# Patient Record
Sex: MALE | Race: WHITE | NOT HISPANIC OR LATINO | Employment: FULL TIME | URBAN - METROPOLITAN AREA
[De-identification: names, ages, dates, MRNs, and addresses within clinical notes are randomized per-mention and may not be internally consistent; named-entity substitution may affect disease eponyms.]

---

## 2023-03-14 ENCOUNTER — TELEMEDICINE (OUTPATIENT)
Dept: PHARMACY | Facility: HOSPITAL | Age: 58
End: 2023-03-14
Payer: COMMERCIAL

## 2023-03-14 DIAGNOSIS — E11.9 DIABETES MELLITUS WITHOUT COMPLICATION (MULTI): Primary | ICD-10-CM

## 2023-03-14 DIAGNOSIS — E11.9 DIABETES MELLITUS WITHOUT COMPLICATION (MULTI): ICD-10-CM

## 2023-03-14 RX ORDER — SEMAGLUTIDE 1.34 MG/ML
1 INJECTION, SOLUTION SUBCUTANEOUS
Qty: 3 ML | Refills: 0 | Status: SHIPPED | OUTPATIENT
Start: 2023-03-14 | End: 2023-06-16 | Stop reason: ALTCHOICE

## 2023-03-14 NOTE — PROGRESS NOTES
Subjective   Victorino Carr is a 57 y.o. male who presents today for Ozempic titration.    Patient hospitalized since last visit, medication list reviewed and reconciled with discharge medications (For ACO requirement): no    HPI     Objective   Medication Dose Adjustment(s): yes  Allergy Drug Reaction(s): Patient has no allergy information on record.  Adverse Drug Reaction(s): no  Interaction(s): Drug-Drug: no  Interaction(s): Drug-Disease: no  New Medication(s) Initiated:   No current outpatient medications on file.     No current facility-administered medications for this visit.       Assessment /Plan   Potentially Inappropriate Medication(s): no  Medication Adherence: Always take medication as prescribed  Other needs: Pt has been taking Ozempic 0.5mg dose instead of 0.25mg dose once a week. He has had no side effects and doing well on medication. I will have him finish 0.5mg pen for 4 injections and then have him start 1mg pen dose.  Medication Education provided: yes  Additional Monitoring: Follow up in 3 weeks  Time Spent on Encounter: 15 Minutes  Total Clinical Interventions: 1

## 2023-03-14 NOTE — PATIENT INSTRUCTIONS
Hello  Finish 4 doses of 0.5mg Ozempic pen then increase to 1mg pen.  Will follow up with you in 3 weeks.

## 2023-03-16 NOTE — PROGRESS NOTES
I reviewed the progress note and agree with the resident’s findings and plans as written. Case discussed with resident.    Da Ludwig, PharmD

## 2023-05-29 DIAGNOSIS — I10 HYPERTENSION, UNSPECIFIED TYPE: Primary | ICD-10-CM

## 2023-05-30 PROBLEM — I10 HTN (HYPERTENSION): Status: ACTIVE | Noted: 2023-05-30

## 2023-05-30 RX ORDER — LISINOPRIL 20 MG/1
TABLET ORAL
Qty: 90 TABLET | Refills: 3 | Status: SHIPPED | OUTPATIENT
Start: 2023-05-30 | End: 2023-10-25 | Stop reason: SDUPTHER

## 2023-06-07 ENCOUNTER — APPOINTMENT (OUTPATIENT)
Dept: PRIMARY CARE | Facility: CLINIC | Age: 58
End: 2023-06-07
Payer: COMMERCIAL

## 2023-06-16 ENCOUNTER — OFFICE VISIT (OUTPATIENT)
Dept: PRIMARY CARE | Facility: CLINIC | Age: 58
End: 2023-06-16
Payer: COMMERCIAL

## 2023-06-16 VITALS
TEMPERATURE: 97.8 F | WEIGHT: 270 LBS | BODY MASS INDEX: 34.65 KG/M2 | HEART RATE: 60 BPM | DIASTOLIC BLOOD PRESSURE: 82 MMHG | HEIGHT: 74 IN | RESPIRATION RATE: 16 BRPM | SYSTOLIC BLOOD PRESSURE: 143 MMHG

## 2023-06-16 DIAGNOSIS — E11.65 UNCONTROLLED TYPE 2 DIABETES MELLITUS WITH HYPERGLYCEMIA (MULTI): ICD-10-CM

## 2023-06-16 DIAGNOSIS — I25.10 ARTERIOSCLEROSIS OF CORONARY ARTERY: ICD-10-CM

## 2023-06-16 DIAGNOSIS — F41.9 ANXIETY: Primary | ICD-10-CM

## 2023-06-16 DIAGNOSIS — E11.9 DIABETES MELLITUS WITHOUT COMPLICATION (MULTI): ICD-10-CM

## 2023-06-16 DIAGNOSIS — E11.9 TYPE 2 DIABETES MELLITUS WITHOUT COMPLICATION, WITH LONG-TERM CURRENT USE OF INSULIN (MULTI): ICD-10-CM

## 2023-06-16 DIAGNOSIS — Z79.4 TYPE 2 DIABETES MELLITUS WITHOUT COMPLICATION, WITH LONG-TERM CURRENT USE OF INSULIN (MULTI): ICD-10-CM

## 2023-06-16 DIAGNOSIS — Z95.0 PRESENCE OF PERMANENT CARDIAC PACEMAKER: ICD-10-CM

## 2023-06-16 PROBLEM — E78.5 HLD (HYPERLIPIDEMIA): Status: ACTIVE | Noted: 2023-06-16

## 2023-06-16 PROBLEM — R10.13 DYSPEPSIA: Status: ACTIVE | Noted: 2023-06-16

## 2023-06-16 PROBLEM — M19.90 OSTEOARTHRITIS: Status: ACTIVE | Noted: 2023-06-16

## 2023-06-16 PROBLEM — K63.5 COLON POLYPS: Status: ACTIVE | Noted: 2023-06-16

## 2023-06-16 PROBLEM — I10 ESSENTIAL HYPERTENSION: Status: ACTIVE | Noted: 2021-04-15

## 2023-06-16 LAB — POC HEMOGLOBIN A1C: 11.1 % (ref 4.2–6.5)

## 2023-06-16 PROCEDURE — 3079F DIAST BP 80-89 MM HG: CPT | Performed by: FAMILY MEDICINE

## 2023-06-16 PROCEDURE — 83036 HEMOGLOBIN GLYCOSYLATED A1C: CPT | Performed by: FAMILY MEDICINE

## 2023-06-16 PROCEDURE — 99214 OFFICE O/P EST MOD 30 MIN: CPT | Performed by: FAMILY MEDICINE

## 2023-06-16 PROCEDURE — 4010F ACE/ARB THERAPY RXD/TAKEN: CPT | Performed by: FAMILY MEDICINE

## 2023-06-16 PROCEDURE — 1036F TOBACCO NON-USER: CPT | Performed by: FAMILY MEDICINE

## 2023-06-16 PROCEDURE — 3077F SYST BP >= 140 MM HG: CPT | Performed by: FAMILY MEDICINE

## 2023-06-16 RX ORDER — METOPROLOL SUCCINATE 25 MG/1
1 TABLET, EXTENDED RELEASE ORAL DAILY
COMMUNITY
Start: 2020-03-31 | End: 2023-06-16 | Stop reason: SINTOL

## 2023-06-16 RX ORDER — ASPIRIN 81 MG/1
1 TABLET ORAL DAILY
COMMUNITY
Start: 2021-04-16

## 2023-06-16 RX ORDER — ATORVASTATIN CALCIUM 80 MG/1
1 TABLET, FILM COATED ORAL DAILY
COMMUNITY
Start: 2022-04-28 | End: 2024-01-03 | Stop reason: SDUPTHER

## 2023-06-16 RX ORDER — ESCITALOPRAM OXALATE 10 MG/1
10 TABLET ORAL DAILY
COMMUNITY
End: 2023-06-16 | Stop reason: SDUPTHER

## 2023-06-16 RX ORDER — CARVEDILOL 12.5 MG/1
6.25 TABLET ORAL 2 TIMES DAILY
Qty: 30 TABLET | Refills: 11 | COMMUNITY
Start: 2023-06-02 | End: 2024-01-03 | Stop reason: SDUPTHER

## 2023-06-16 RX ORDER — ESCITALOPRAM OXALATE 10 MG/1
10 TABLET ORAL DAILY
Qty: 30 TABLET | Refills: 11 | Status: SHIPPED | OUTPATIENT
Start: 2023-06-16 | End: 2024-01-03 | Stop reason: SDUPTHER

## 2023-06-16 RX ORDER — NITROGLYCERIN 0.4 MG/1
0.4 TABLET SUBLINGUAL EVERY 5 MIN PRN
COMMUNITY
Start: 2021-04-16

## 2023-06-16 RX ORDER — INSULIN GLARGINE 100 [IU]/ML
60 INJECTION, SOLUTION SUBCUTANEOUS DAILY
COMMUNITY
End: 2023-08-18

## 2023-06-16 ASSESSMENT — ENCOUNTER SYMPTOMS
POLYDIPSIA: 0
VISUAL CHANGE: 0

## 2023-06-16 NOTE — ASSESSMENT & PLAN NOTE
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  I will provide a letter stating that he has a pacemaker to his employer and should not be exposed to electromagnetic waves

## 2023-06-16 NOTE — ASSESSMENT & PLAN NOTE
This is slightly above goal but we will focus on the diabetes first and then come back to the blood pressure if it remains high

## 2023-06-16 NOTE — LETTER
June 16, 2023     Patient: Victorino Carr   YOB: 1965   Date of Visit: 6/16/2023       To Whom It May Concern:    Victorino Carr was seen in my clinic on 6/16/2023 at 2:20 pm. He has a pacemaker and can not work around machinery that emits electromagnetic waves that could interfere with the pacemaker.    If you have any questions or concerns, please don't hesitate to call.         Sincerely,         Vasquez Sosa MD        CC: No Recipients

## 2023-06-16 NOTE — ASSESSMENT & PLAN NOTE
Lab Results   Component Value Date    HGBA1C 11.1 (A) 06/16/2023     Unfortunately this 57-year-old male remains severely uncontrolled with his diabetes despite compliance with the current treatment regimen of Lantus 68 units daily and metformin 500 mg twice daily.  He is describing some loose stool already so an increase in metformin is probably not helpful.  We will go ahead and titrate the Ozempic from 1 mg to 2 mg.  We will also add Jardiance which will give him the additional cardioprotective benefit considering his coronary artery disease.  I am also referring him to our in-house pharmacist to help continue the Ozempic titration as well as titrate the Lantus or consider other interventions to get his diabetes to goal.

## 2023-06-16 NOTE — PROGRESS NOTES
Subjective   Victorino Carr is a 57 y.o. male who presents for Diabetes.  Diabetes  He presents for his follow-up diabetic visit. He has type 2 diabetes mellitus. His disease course has been stable. Pertinent negatives for diabetes include no foot paresthesias, no polydipsia, no polyuria and no visual change. Current diabetic treatment includes insulin injections and oral agent (monotherapy). He is compliant with treatment most of the time.     Visit Vitals  /82   Pulse 60   Temp 36.6 °C (97.8 °F)   Resp 16      Objective   Physical Exam  Constitutional:       Appearance: Normal appearance.   HENT:      Head: Normocephalic.   Eyes:      Conjunctiva/sclera: Conjunctivae normal.   Cardiovascular:      Rate and Rhythm: Normal rate and regular rhythm.   Pulmonary:      Effort: Pulmonary effort is normal.      Breath sounds: Normal breath sounds.   Musculoskeletal:      Cervical back: Neck supple.   Skin:     General: Skin is warm and dry.   Neurological:      Mental Status: He is alert.         Assessment/Plan    Problem List Items Addressed This Visit       Uncontrolled type 2 diabetes mellitus with hyperglycemia (CMS/[facility])    Relevant Medications    empagliflozin (Jardiance) 10 mg    semaglutide 2 mg/dose (8 mg/3 mL) pen injector    Other Relevant Orders    POCT glycosylated hemoglobin (Hb A1C) manually resulted (Completed)    Follow Up In [facility] - Pharmacy    Follow Up In [facility] - PCP    Anxiety - Primary    Relevant Medications    escitalopram (Lexapro) 10 mg tablet    Arteriosclerosis of coronary artery     He will continue secondary prevention with aspirin, atorvastatin, carvedilol, glycemic control.         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  I will provide a letter stating that he has a pacemaker to his employer and should not be exposed to electromagnetic waves         Relevant Orders    Referral to Cardiology    T2DM (type 2 diabetes mellitus) (CMS/[facility])     Lab Results   Component Value Date    HGBA1C 11.1 (A) 06/16/2023   Unfortunately this 57-year-old male remains severely uncontrolled with his diabetes despite compliance with the current treatment regimen of Lantus 68 units daily and metformin 500 mg twice daily.  He is describing some loose stool already so an increase in metformin is probably not helpful.  We will go ahead and titrate the Ozempic from 1 mg to 2 mg.  We will also add Jardiance which will give him the additional cardioprotective benefit considering his coronary artery disease.  I am also referring him to our in-house pharmacist to help continue the Ozempic titration as well as titrate the Lantus or consider other interventions to get his diabetes to goal.          Other Visit Diagnoses       Diabetes mellitus without complication (CMS/[facility])

## 2023-06-20 ENCOUNTER — TELEPHONE (OUTPATIENT)
Dept: PRIMARY CARE | Facility: CLINIC | Age: 58
End: 2023-06-20
Payer: COMMERCIAL

## 2023-06-20 NOTE — TELEPHONE ENCOUNTER
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 She is asking for better clarification and boundaries, he will not be able to work until she has this information for his safety.    Amaya: [phone]

## 2023-06-26 ENCOUNTER — TELEPHONE (OUTPATIENT)
Dept: PRIMARY CARE | Facility: CLINIC | Age: 58
End: 2023-06-26
Payer: COMMERCIAL

## 2023-06-26 NOTE — TELEPHONE ENCOUNTER
Pt called; stated he is not feeling well at all, flu like symptoms. He wants to know if you can fit him in this week, Mon, Tue, or Wed. Please advise, thank you.

## 2023-06-28 ENCOUNTER — OFFICE VISIT (OUTPATIENT)
Dept: PRIMARY CARE | Facility: CLINIC | Age: 58
End: 2023-06-28
Payer: COMMERCIAL

## 2023-06-28 VITALS
SYSTOLIC BLOOD PRESSURE: 138 MMHG | DIASTOLIC BLOOD PRESSURE: 78 MMHG | TEMPERATURE: 97.6 F | HEIGHT: 74 IN | BODY MASS INDEX: 33.88 KG/M2 | RESPIRATION RATE: 16 BRPM | HEART RATE: 72 BPM | WEIGHT: 264 LBS

## 2023-06-28 DIAGNOSIS — K52.9 GASTROENTERITIS: Primary | ICD-10-CM

## 2023-06-28 PROCEDURE — 1036F TOBACCO NON-USER: CPT | Performed by: FAMILY MEDICINE

## 2023-06-28 PROCEDURE — 4010F ACE/ARB THERAPY RXD/TAKEN: CPT | Performed by: FAMILY MEDICINE

## 2023-06-28 PROCEDURE — 3075F SYST BP GE 130 - 139MM HG: CPT | Performed by: FAMILY MEDICINE

## 2023-06-28 PROCEDURE — 99214 OFFICE O/P EST MOD 30 MIN: CPT | Performed by: FAMILY MEDICINE

## 2023-06-28 PROCEDURE — 3078F DIAST BP <80 MM HG: CPT | Performed by: FAMILY MEDICINE

## 2023-06-28 RX ORDER — ONDANSETRON 4 MG/1
4 TABLET, FILM COATED ORAL EVERY 8 HOURS PRN
Qty: 20 TABLET | Refills: 0 | Status: SHIPPED | OUTPATIENT
Start: 2023-06-28 | End: 2023-07-05

## 2023-06-28 RX ORDER — OMEPRAZOLE 20 MG/1
20 TABLET, DELAYED RELEASE ORAL DAILY
Qty: 14 TABLET | Refills: 0 | COMMUNITY
Start: 2023-06-28 | End: 2023-07-12

## 2023-06-28 ASSESSMENT — ENCOUNTER SYMPTOMS
DIARRHEA: 1
ABDOMINAL PAIN: 1
VOMITING: 1

## 2023-06-28 NOTE — LETTER
June 28, 2023     Patient: Victorino Carr   YOB: 1965   Date of Visit: 6/28/2023       To Whom It May Concern:    Victorino Carr was seen in my clinic on 6/28/2023 at 3:10 pm. Please excuse Victorino for his absence from work on 6/25/2023 thru 6/30/2023 and may return to work on 1/1/2023 without restriction.    If you have any questions or concerns, please don't hesitate to call.         Sincerely,         Vasquez Sosa MD        CC: No Recipients

## 2023-06-28 NOTE — PROGRESS NOTES
Subjective   Victorino Carr is a 57 y.o. male who presents for Vomiting.  Vomiting   This is a new problem. Episode onset: 3 days ago. The problem has been unchanged. There has been no fever. Associated symptoms include abdominal pain and diarrhea. He has tried diet change and sleep for the symptoms. The treatment provided no relief.     Visit Vitals  /78   Pulse 72   Temp 36.4 °C (97.6 °F)   Resp 16      Objective   Physical Exam  Constitutional:       Appearance: Normal appearance.   HENT:      Head: Normocephalic.   Pulmonary:      Effort: Pulmonary effort is normal.   Abdominal:      General: Abdomen is flat. Bowel sounds are normal.      Palpations: Abdomen is soft. There is no mass.      Tenderness: There is abdominal tenderness (diffuse). There is no guarding or rebound.   Musculoskeletal:      Cervical back: Neck supple.      Right lower leg: No edema.      Left lower leg: No edema.   Skin:     General: Skin is warm and dry.   Psychiatric:         Mood and Affect: Mood normal.         Assessment/Plan    Problem List Items Addressed This Visit    None  Visit Diagnoses       Gastroenteritis    -  Primary    Relevant Medications    ondansetron (Zofran) 4 mg tablet    omeprazole OTC (PriLOSEC OTC) 20 mg EC tablet    Other Relevant Orders    CBC        This 57-year-old male has symptoms consistent with viral gastroenteritis causing significant nausea, vomiting, likely dehydration, and loose stool as well.  He denies any history of drinking untreated water and has no foreign travel making parasite infection very unlikely.  This is most likely a gastroenteritis or food poisoning which should be self-limited.  We will treat the symptoms with Zofran for nausea and omeprazole for reduction stomach acid.    However, he also has a history of significant over-the-counter NSAID use over a long period of time putting him at risk for a stomach ulcer.  He describes possibly 1 episode of coffee-ground emesis but has  not had any subsequent episodes.  There is a chance that this could be a gastric or peptic ulcer.  We will check a CBC and if the level is low he will need GI work-up.  If the symptoms do not resolve by Monday he will also likely benefit from an for referral to GI by 4 evaluation.

## 2023-06-29 ENCOUNTER — LAB (OUTPATIENT)
Dept: LAB | Facility: LAB | Age: 58
End: 2023-06-29
Payer: COMMERCIAL

## 2023-06-29 DIAGNOSIS — K52.9 GASTROENTERITIS: ICD-10-CM

## 2023-06-29 LAB
ERYTHROCYTE DISTRIBUTION WIDTH (RATIO) BY AUTOMATED COUNT: 12.5 % (ref 11.5–14.5)
ERYTHROCYTE MEAN CORPUSCULAR HEMOGLOBIN CONCENTRATION (G/DL) BY AUTOMATED: 31.4 G/DL (ref 32–36)
ERYTHROCYTE MEAN CORPUSCULAR VOLUME (FL) BY AUTOMATED COUNT: 89 FL (ref 80–100)
ERYTHROCYTES (10*6/UL) IN BLOOD BY AUTOMATED COUNT: 5.5 X10E12/L (ref 4.5–5.9)
HEMATOCRIT (%) IN BLOOD BY AUTOMATED COUNT: 49.1 % (ref 41–52)
HEMOGLOBIN (G/DL) IN BLOOD: 15.4 G/DL (ref 13.5–17.5)
LEUKOCYTES (10*3/UL) IN BLOOD BY AUTOMATED COUNT: 12.2 X10E9/L (ref 4.4–11.3)
NRBC (PER 100 WBCS) BY AUTOMATED COUNT: 0 /100 WBC (ref 0–0)
PLATELETS (10*3/UL) IN BLOOD AUTOMATED COUNT: 324 X10E9/L (ref 150–450)

## 2023-06-29 PROCEDURE — 85027 COMPLETE CBC AUTOMATED: CPT

## 2023-06-29 PROCEDURE — 36415 COLL VENOUS BLD VENIPUNCTURE: CPT

## 2023-06-30 NOTE — RESULT ENCOUNTER NOTE
Please call Victorino Carr regarding lab results and tell him  : The blood counts were normal indicating this is probably not a stomach ulcer.  If your nausea and diarrhea do not resolve within 1 week please let us know so we can order additional tests

## 2023-08-18 DIAGNOSIS — Z79.4 TYPE 2 DIABETES MELLITUS WITHOUT COMPLICATION, WITH LONG-TERM CURRENT USE OF INSULIN (MULTI): Primary | ICD-10-CM

## 2023-08-18 DIAGNOSIS — E11.9 TYPE 2 DIABETES MELLITUS WITHOUT COMPLICATION, WITH LONG-TERM CURRENT USE OF INSULIN (MULTI): Primary | ICD-10-CM

## 2023-08-18 RX ORDER — INSULIN GLARGINE 100 [IU]/ML
60 INJECTION, SOLUTION SUBCUTANEOUS DAILY
Qty: 75 ML | Refills: 3 | Status: SHIPPED | OUTPATIENT
Start: 2023-08-18 | End: 2023-10-25 | Stop reason: DRUGHIGH

## 2023-10-19 ENCOUNTER — DOCUMENTATION (OUTPATIENT)
Dept: PRIMARY CARE | Facility: CLINIC | Age: 58
End: 2023-10-19
Payer: COMMERCIAL

## 2023-10-20 ENCOUNTER — PATIENT OUTREACH (OUTPATIENT)
Dept: PRIMARY CARE | Facility: CLINIC | Age: 58
End: 2023-10-20
Payer: COMMERCIAL

## 2023-10-20 RX ORDER — GLIPIZIDE 5 MG/1
0.5 TABLET ORAL DAILY
COMMUNITY
End: 2023-12-29 | Stop reason: ALTCHOICE

## 2023-10-20 RX ORDER — ISOSORBIDE MONONITRATE 30 MG/1
30 TABLET, EXTENDED RELEASE ORAL DAILY
COMMUNITY

## 2023-10-20 RX ORDER — CLOPIDOGREL BISULFATE 75 MG/1
75 TABLET ORAL DAILY
COMMUNITY

## 2023-10-20 NOTE — PROGRESS NOTES
Discharge Facility:[facility] main  Discharge Diagnosis:unstable angina  Admission Date: 10/8/2023  Discharge Date: 10/19/2023    PCP Appointment Date:none  Specialist Appointment Date: cardiology 10/27/2023  Hospital Encounter and Summary: Linked   See discharge assessment below for further details    Engagement  Call Start Time: 1420 (10/20/2023  2:20 PM)    Medications  Medications reviewed with patient/caregiver?: Yes (10/20/2023  2:20 PM)  Is the patient having any side effects they believe may be caused by any medication additions or changes?: No (10/20/2023  2:20 PM)  Does the patient have all medications ordered at discharge?: Yes (10/20/2023  2:20 PM)  Care Management Interventions: No intervention needed (10/20/2023  2:20 PM)  Prescription Comments: new: plavix, jardiance, lexapro, glucotrol, imdur, metformin. change:atorvastatin, insulin, lisinopril, nitro (10/20/2023  2:20 PM)  Is the patient taking all medications as directed (includes completed medication regime)?: Yes (10/20/2023  2:20 PM)  Medication Comments: see med list (10/20/2023  2:20 PM)    Appointments  Does the patient have a primary care provider?: Yes (10/20/2023  2:20 PM)  Care Management Interventions: Advised patient to make appointment (10/20/2023  2:20 PM)  Has the patient kept scheduled appointments due by today?: Yes (10/20/2023  2:20 PM)  Care Management Interventions: Advised patient to keep appointment (10/20/2023  2:20 PM)    Self Management  What is the home health agency?: none (10/20/2023  2:20 PM)  Has home health visited the patient within 72 hours of discharge?: Not applicable (10/20/2023  2:20 PM)    Patient Teaching  Does the patient have access to their discharge instructions?: Yes (10/20/2023  2:20 PM)  Care Management Interventions: Reviewed instructions with patient (10/20/2023  2:20 PM)  What is the patient's perception of their health status since discharge?: Improving (10/20/2023  2:20 PM)  Is the patient/caregiver able  to teach back the hierarchy of who to call/visit for symptoms/problems? PCP, Specialist, Home Health nurse, Urgent Care, ED, 911: Yes (10/20/2023  2:20 PM)    Wrap Up  Wrap Up Additional Comments: CTS spoke with patient. he stated that he is doing well. denies chest pain or shortness of breath. no questions or concerns regarding his medications at this time. working with his pharmacy so he doesnt get what is not needed. has his daughter there to help. no follow up as of this time. will task office staff for appt help. has my contact info if any needs arise. (10/20/2023  2:20 PM)  Call End Time: 1424 (10/20/2023  2:20 PM)

## 2023-10-25 ENCOUNTER — OFFICE VISIT (OUTPATIENT)
Dept: PRIMARY CARE | Facility: CLINIC | Age: 58
End: 2023-10-25
Payer: COMMERCIAL

## 2023-10-25 VITALS
HEART RATE: 60 BPM | DIASTOLIC BLOOD PRESSURE: 50 MMHG | WEIGHT: 263 LBS | RESPIRATION RATE: 16 BRPM | BODY MASS INDEX: 33.75 KG/M2 | TEMPERATURE: 98.2 F | HEIGHT: 74 IN | SYSTOLIC BLOOD PRESSURE: 127 MMHG

## 2023-10-25 DIAGNOSIS — I10 PRIMARY HYPERTENSION: ICD-10-CM

## 2023-10-25 DIAGNOSIS — E11.51 TYPE 2 DIABETES MELLITUS WITH DIABETIC PERIPHERAL ANGIOPATHY WITHOUT GANGRENE, WITH LONG-TERM CURRENT USE OF INSULIN (MULTI): Primary | ICD-10-CM

## 2023-10-25 DIAGNOSIS — Z79.4 TYPE 2 DIABETES MELLITUS WITH DIABETIC PERIPHERAL ANGIOPATHY WITHOUT GANGRENE, WITH LONG-TERM CURRENT USE OF INSULIN (MULTI): Primary | ICD-10-CM

## 2023-10-25 DIAGNOSIS — I25.10 CORONARY ARTERIOSCLEROSIS: ICD-10-CM

## 2023-10-25 DIAGNOSIS — E11.65 UNCONTROLLED TYPE 2 DIABETES MELLITUS WITH HYPERGLYCEMIA (MULTI): ICD-10-CM

## 2023-10-25 DIAGNOSIS — Z79.4 TYPE 2 DIABETES MELLITUS WITHOUT COMPLICATION, WITH LONG-TERM CURRENT USE OF INSULIN (MULTI): ICD-10-CM

## 2023-10-25 DIAGNOSIS — Z00.00 ROUTINE GENERAL MEDICAL EXAMINATION AT A HEALTH CARE FACILITY: ICD-10-CM

## 2023-10-25 DIAGNOSIS — I10 HYPERTENSION, UNSPECIFIED TYPE: ICD-10-CM

## 2023-10-25 DIAGNOSIS — E11.9 TYPE 2 DIABETES MELLITUS WITHOUT COMPLICATION, WITH LONG-TERM CURRENT USE OF INSULIN (MULTI): ICD-10-CM

## 2023-10-25 PROCEDURE — 4010F ACE/ARB THERAPY RXD/TAKEN: CPT | Performed by: FAMILY MEDICINE

## 2023-10-25 PROCEDURE — 1036F TOBACCO NON-USER: CPT | Performed by: FAMILY MEDICINE

## 2023-10-25 PROCEDURE — 99214 OFFICE O/P EST MOD 30 MIN: CPT | Performed by: FAMILY MEDICINE

## 2023-10-25 PROCEDURE — 3074F SYST BP LT 130 MM HG: CPT | Performed by: FAMILY MEDICINE

## 2023-10-25 PROCEDURE — 3078F DIAST BP <80 MM HG: CPT | Performed by: FAMILY MEDICINE

## 2023-10-25 RX ORDER — LISINOPRIL 20 MG/1
20 TABLET ORAL DAILY
Qty: 90 TABLET | Refills: 3
Start: 2023-10-25

## 2023-10-25 RX ORDER — INSULIN GLARGINE 100 [IU]/ML
60 INJECTION, SOLUTION SUBCUTANEOUS DAILY
Qty: 75 ML | Refills: 3 | Status: SHIPPED
Start: 2023-10-25 | End: 2024-01-03 | Stop reason: SDUPTHER

## 2023-10-25 RX ORDER — METFORMIN HYDROCHLORIDE 500 MG/1
500 TABLET ORAL 2 TIMES DAILY
Qty: 60 TABLET | Refills: 0
Start: 2023-10-25

## 2023-10-25 NOTE — ASSESSMENT & PLAN NOTE
This 58-year-old male was admitted to the [facility] for angina.  Catheterization revealed multiple areas of stenosis and he underwent PCI x4.  He had significant dilution of his angina symptoms and is now on secondary prevention with dual antiplatelet therapy including aspirin and Plavix, high intensity statin, and blood pressure is well controlled.

## 2023-10-25 NOTE — ASSESSMENT & PLAN NOTE
Unfortunately he was unable to get Ozempic due to cost and lack of insurance coverage.  He was able to start Jardiance and has tolerated this well but his sugars continue to run high.  He now has a continuous glucose monitor showing average sugars above 200 most of the time.  We discussed options and very well may need to move to mealtime insulin but for now we will try maximizing the Jardiance by increasing to 25 mg, continuing glipizide, metformin, and 60 units of basal insulin.  He will follow-up in 3 months to determine if mealtime insulin is needed

## 2023-10-25 NOTE — PROGRESS NOTES
Subjective   Victorino Carr is a 58 y.o. male who presents for Hospital Follow-up.  HPI  He was admitted to [facility] from 10/8/23-10/19/23 for unstable angina.  He indicates that he had an urgent heart catheterization followed by PCI on October 17.  He underwent a left circumflex stent x2 complicated by V-fib requiring defibrillation from when she stabilized.  He was also noted to have uncontrolled diabetes endocrinology was consulted  Pt states his blood sugars have not been under 250 since he was discharged. His HgbA1c was 11.3% on 10/8/23.  Visit Vitals  /50   Pulse 60   Temp 36.8 °C (98.2 °F)   Resp 16      Objective   Physical Exam  Constitutional:       Appearance: Normal appearance.   HENT:      Head: Normocephalic.   Eyes:      Conjunctiva/sclera: Conjunctivae normal.   Cardiovascular:      Rate and Rhythm: Normal rate and regular rhythm.   Pulmonary:      Effort: Pulmonary effort is normal.      Breath sounds: Normal breath sounds.   Musculoskeletal:      Cervical back: Neck supple.   Skin:     General: Skin is warm and dry.   Neurological:      Mental Status: He is alert.         Assessment/Plan    Problem List Items Addressed This Visit       Primary hypertension    Relevant Medications    lisinopril 20 mg tablet    Coronary arteriosclerosis     This 58-year-old male was admitted to the [facility] for angina.  Catheterization revealed multiple areas of stenosis and he underwent PCI x4.  He had significant dilution of his angina symptoms and is now on secondary prevention with dual antiplatelet therapy including aspirin and Plavix, high intensity statin, and blood pressure is well controlled.         Type 2 diabetes mellitus with diabetic peripheral angiopathy without gangrene, with long-term current use of insulin (CMS/[facility]) - Primary     Unfortunately he was unable to get Ozempic due to cost and lack of insurance coverage.  He was able to start Jardiance and has tolerated this  well but his sugars continue to run high.  He now has a continuous glucose monitor showing average sugars above 200 most of the time.  We discussed options and very well may need to move to mealtime insulin but for now we will try maximizing the Jardiance by increasing to 25 mg, continuing glipizide, metformin, and 60 units of basal insulin.  He will follow-up in 3 months to determine if mealtime insulin is needed         Relevant Medications    insulin glargine (Lantus Solostar U-100 Insulin) 100 unit/mL (3 mL) pen     Other Visit Diagnoses       Type 2 diabetes mellitus without complication, with long-term current use of insulin (CMS/[facility])        Relevant Medications    insulin glargine (Lantus Solostar U-100 Insulin) 100 unit/mL (3 mL) pen    Other Relevant Orders    Follow Up In [facility] - PCP - Established    Hypertension, unspecified type        Relevant Medications    lisinopril 20 mg tablet    Routine general medical examination at a health care facility        Relevant Medications    metFORMIN (Glucophage) 500 mg tablet    Uncontrolled type 2 diabetes mellitus with hyperglycemia (CMS/[facility])        Relevant Medications    empagliflozin (Jardiance) 25 mg

## 2023-10-30 DIAGNOSIS — Z79.4 TYPE 2 DIABETES MELLITUS WITH DIABETIC PERIPHERAL ANGIOPATHY WITHOUT GANGRENE, WITH LONG-TERM CURRENT USE OF INSULIN (MULTI): ICD-10-CM

## 2023-10-30 DIAGNOSIS — E11.51 TYPE 2 DIABETES MELLITUS WITH DIABETIC PERIPHERAL ANGIOPATHY WITHOUT GANGRENE, WITH LONG-TERM CURRENT USE OF INSULIN (MULTI): ICD-10-CM

## 2023-10-30 NOTE — TELEPHONE ENCOUNTER
Rx Refill Request Telephone Encounter    Name:  Victorino Carr  :  [id number]  Medication Name:  michel 3 glucose monitor (the one that sticks to arm)    Specific Pharmacy location:  [facility], [address], [place]  Date of last appointment:  na  Date of next appointment:  na  Best number to reach patient:  [phone]    **Pt stated he needs a new one.

## 2023-10-31 DIAGNOSIS — E11.51 TYPE 2 DIABETES MELLITUS WITH DIABETIC PERIPHERAL ANGIOPATHY WITHOUT GANGRENE, WITH LONG-TERM CURRENT USE OF INSULIN (MULTI): ICD-10-CM

## 2023-10-31 DIAGNOSIS — Z79.4 TYPE 2 DIABETES MELLITUS WITH DIABETIC PERIPHERAL ANGIOPATHY WITHOUT GANGRENE, WITH LONG-TERM CURRENT USE OF INSULIN (MULTI): ICD-10-CM

## 2023-10-31 RX ORDER — BLOOD-GLUCOSE SENSOR
1 EACH MISCELLANEOUS
COMMUNITY
End: 2023-10-31 | Stop reason: SDUPTHER

## 2023-10-31 NOTE — TELEPHONE ENCOUNTER
Rx Refill Request Telephone Encounter    Name:  Victorino Carr  :  [id number]  Medication Name:  Dexacom G7            Specific Pharmacy location:  [facility] [address]  [phone]  Date of last appointment:  n/a  Date of next appointment:  n/a  Best number to reach patient: Pt states that is compatible with the [facility] and insurance will pay for it [phone]

## 2023-11-02 ENCOUNTER — PATIENT OUTREACH (OUTPATIENT)
Dept: PRIMARY CARE | Facility: CLINIC | Age: 58
End: 2023-11-02
Payer: COMMERCIAL

## 2023-11-02 NOTE — PROGRESS NOTES
Unable to reach patient for call back after patient's follow up appointment with PCP.  10/25/77998  M with call back number for patient to call if needed

## 2023-11-03 PROBLEM — G47.33 OSA (OBSTRUCTIVE SLEEP APNEA): Status: ACTIVE | Noted: 2023-11-03

## 2023-11-03 PROBLEM — I49.5 SSS (SICK SINUS SYNDROME) (MULTI): Status: ACTIVE | Noted: 2023-11-03

## 2023-11-03 RX ORDER — SEMAGLUTIDE 1.34 MG/ML
0.5 INJECTION, SOLUTION SUBCUTANEOUS
COMMUNITY
End: 2023-12-29

## 2023-11-03 RX ORDER — SERTRALINE HYDROCHLORIDE 50 MG/1
50 TABLET, FILM COATED ORAL DAILY
COMMUNITY

## 2023-11-03 RX ORDER — PANTOPRAZOLE SODIUM 20 MG/1
20 TABLET, DELAYED RELEASE ORAL DAILY
COMMUNITY
Start: 2023-10-27 | End: 2024-10-26

## 2023-11-06 ENCOUNTER — APPOINTMENT (OUTPATIENT)
Dept: CARDIAC REHAB | Facility: HOSPITAL | Age: 58
End: 2023-11-06
Payer: COMMERCIAL

## 2023-11-07 ENCOUNTER — APPOINTMENT (OUTPATIENT)
Dept: CARDIAC REHAB | Facility: HOSPITAL | Age: 58
End: 2023-11-07
Payer: COMMERCIAL

## 2023-11-08 ENCOUNTER — CLINICAL SUPPORT (OUTPATIENT)
Dept: CARDIAC REHAB | Facility: HOSPITAL | Age: 58
End: 2023-11-08
Payer: COMMERCIAL

## 2023-11-08 DIAGNOSIS — Z95.5 PRESENCE OF CORONARY ANGIOPLASTY IMPLANT AND GRAFT: ICD-10-CM

## 2023-11-15 ENCOUNTER — CLINICAL SUPPORT (OUTPATIENT)
Dept: CARDIAC REHAB | Facility: HOSPITAL | Age: 58
End: 2023-11-15
Payer: COMMERCIAL

## 2023-11-15 DIAGNOSIS — Z95.5 PRESENCE OF CORONARY ANGIOPLASTY IMPLANT AND GRAFT: ICD-10-CM

## 2023-11-15 PROCEDURE — 93798 PHYS/QHP OP CAR RHAB W/ECG: CPT | Performed by: INTERNAL MEDICINE

## 2023-11-20 ENCOUNTER — CLINICAL SUPPORT (OUTPATIENT)
Dept: CARDIAC REHAB | Facility: HOSPITAL | Age: 58
End: 2023-11-20
Payer: COMMERCIAL

## 2023-11-20 DIAGNOSIS — Z95.5 PRESENCE OF CORONARY ANGIOPLASTY IMPLANT AND GRAFT: ICD-10-CM

## 2023-11-20 PROCEDURE — 93798 PHYS/QHP OP CAR RHAB W/ECG: CPT | Performed by: INTERNAL MEDICINE

## 2023-11-20 NOTE — PROGRESS NOTES
Cardiac Rehabilitation Initial Assessment (iITP)    Name: Victorino Carr  Medical Record Number: [id number]  YOB: 1965    Today’s Date: 11/20/2023  Primary Care Physician: Vasquez Sosa MD  Referring Physician: ***    General  1. Presence of coronary angioplasty implant and graft  Follow Up In Cardiac Rehab          AACVPR Risk Stratification:{AACVPR Risk Stratification:19600}    Medical History  Past Medical History:   Diagnosis Date    Diabetes mellitus (CMS/HCC)      Past Surgical History:   Procedure Laterality Date    OTHER SURGICAL HISTORY  08/26/2019    Pacemaker insertion    OTHER SURGICAL HISTORY  08/26/2019    Coronary artery stent placement     Allergies: No Known Allergies    Psychosocial Assessment  Patient is returning for a follow up visit after reporting minimal to mild depressive symptoms.     Please document a new PHQ-9 score.    Pt reported/currently experiencing: {Yes/No:77226}  Currently seeing a mental health provider: {Yes/No:86631}  Social Support: {Yes/No:02319}    Learning assessment/barriers: {Assessment/barriers:21292}  Preferred learning method: {Preferred learning method:75868}   Quality of Life Survey:{Quality of Life Survey:52865}    Educational Assessment:  Cardiac Knowledge Test: ***/15    Stages of Change:{Stages of change:52125}    Psychosocial Goals: ***  Psychosocial Interventions/Education: ***        Nutrition Assessment:    Hyperlipidemia: {Yes/No:99301} ***    Lipids: ***  Lipid Draw Date: ***    Current Dietary Guidelines:{Dietary Guidelines:80994}  Barriers to dietary change: {Yes/No:74896} ***    Diet Habit Survey score: ***    Diabetes Assessment    Diabetes:{Yes or No:28146}   Medication: {Medication:69086}  Last Hemoglobin A1C: ***   Last Hemoglobin A1C date: ***  Pt monitors BS at home: {Yes/No:92198}  Frequency: ***  FBS range: ***  Hypoglycemic Episodes: {Yes/No:64074}    Weight Management:    There were no vitals filed for this visit.  There is no  height or weight on file to calculate BMI.    Nutrition Goals: ***  Nutrition Interventions/Education: ***    Exercise Assessment:  Current Home Exercise: {Yes/No:69058}  Mode: ***  Days/Week: ***  Min/Day: ***    Exercise Prescription:    Based on: {{Exercise Prescription:97179}   Frequency:  *** days/week   Mode: {Mode:28313}   Duration: *** total aerobic minutes   Intensity: RPE ***       Target HR ***       METS ***       SpO2 Range *** %       O2 Flow Rate *** L/min     Modality METS Load Duration   1 Pre-Exercise *** *** ***   2 Treadmill *** *** *** ***   3 Nustep 4000 *** *** *** ***   4 Recumbent Cycle *** *** *** ***   5 Weights *** *** *** ***   6 Post-Exercise *** *** ***     Exercise Goals: ***  Exercise Interventions/Education: ***    Other Core Components/Risk Factor Assessment:    Medication adherence:   Current Medications:   Current Outpatient Medications   Medication Sig Dispense Refill    aspirin 81 mg EC tablet Take 1 tablet (81 mg) by mouth once daily.      atorvastatin (Lipitor) 80 mg tablet Take 1 tablet (80 mg) by mouth once daily.      blood-glucose sensor device 1 each every 14 (fourteen) days. Replace CGM sensor every 14 days (Dexcom G7) 2 each 11    carvedilol (Coreg) 12.5 mg tablet Take 0.5 tablets (6.25 mg) by mouth twice a day. 30 tablet 11    clopidogrel (Plavix) 75 mg tablet Take 1 tablet (75 mg) by mouth once daily.      empagliflozin (Jardiance) 25 mg Take 1 tablet (25 mg) by mouth once daily. 30 tablet 11    escitalopram (Lexapro) 10 mg tablet Take 1 tablet (10 mg) by mouth once daily. 30 tablet 11    glipiZIDE (Glucotrol) 5 mg tablet Take 0.5 tablets (2.5 mg) by mouth early in the morning..      insulin glargine (Lantus Solostar U-100 Insulin) 100 unit/mL (3 mL) pen Inject 60 Units under the skin once daily. 75 mL 3    isosorbide mononitrate ER (Imdur) 30 mg 24 hr tablet Take 1 tablet (30 mg) by mouth once daily. Do not crush or chew.      lisinopril 20 mg tablet Take 1 tablet  (20 mg) by mouth once daily. 90 tablet 3    metFORMIN (Glucophage) 500 mg tablet Take 1 tablet (500 mg) by mouth 2 times a day. 60 tablet 0    nitroglycerin (Nitrostat) 0.4 mg SL tablet Place 1 tablet (0.4 mg) under the tongue every 5 minutes if needed for chest pain.      omeprazole OTC (PriLOSEC OTC) 20 mg EC tablet Take 1 tablet (20 mg) by mouth once daily for 14 days. Do not crush, chew, or split. 14 tablet 0    pantoprazole (ProtoNix) 20 mg EC tablet Take 1 tablet (20 mg) by mouth once daily.      semaglutide (Ozempic) 0.25 mg or 0.5 mg(2 mg/1.5 mL) pen injector Inject 0.5 mg under the skin 1 (one) time per week.      sertraline (Zoloft) 50 mg tablet Take 1 tablet (50 mg) by mouth once daily.       No current facility-administered medications for this visit.                 Taking medications as prescribed: {Yes/No:88828}   If no, why: ***   Uses pill box/organizer: {Yes/No:48323}   Carries medication list: {Yes/No:14004}    Blood Pressure Management:  Hx of Hypertension: {Yes/No:55276}  Resting BP: Growth %ile SmartLinks can only be used for patients less than 20 years old.     Heart Failure Management:  Hx of Heart Failure:{Yes/No:87076}  Type (selection): {Type (selection):67740}  Most recent EF: @LVEF%@  Date:***  Onset of heart failure diagnosis: ***  Last heart failure hospitalization: ***  Number of HF admissions per year: ***  Symptoms:{Symptoms:79772}  Is there a family Hx of HF:{Yes/No:10870}  Does patient obtain daily weight {Yes/No:65394}    Smoking/Tobacco Assessment:    Tobacco Use: Medium Risk (11/3/2023)    Patient History     Smoking Tobacco Use: Former     Smokeless Tobacco Use: Never     Passive Exposure: Not on file       Other Core Component Goals: ***  Other Core Component Interventions/Education: ***       Individual Patient Goals:  ***      Rehaab Staff Signature: Ce Spicer RN  Electronic MD Review Signature: ***

## 2023-11-22 ENCOUNTER — CLINICAL SUPPORT (OUTPATIENT)
Dept: CARDIAC REHAB | Facility: HOSPITAL | Age: 58
End: 2023-11-22
Payer: COMMERCIAL

## 2023-11-22 DIAGNOSIS — Z95.5 PRESENCE OF CORONARY ANGIOPLASTY IMPLANT AND GRAFT: ICD-10-CM

## 2023-11-22 PROCEDURE — 93798 PHYS/QHP OP CAR RHAB W/ECG: CPT | Performed by: INTERNAL MEDICINE

## 2023-12-01 ENCOUNTER — APPOINTMENT (OUTPATIENT)
Dept: CARDIAC REHAB | Facility: HOSPITAL | Age: 58
End: 2023-12-01
Payer: COMMERCIAL

## 2023-12-04 ENCOUNTER — PATIENT OUTREACH (OUTPATIENT)
Dept: PRIMARY CARE | Facility: CLINIC | Age: 58
End: 2023-12-04
Payer: COMMERCIAL

## 2023-12-15 ENCOUNTER — APPOINTMENT (OUTPATIENT)
Dept: PRIMARY CARE | Facility: CLINIC | Age: 58
End: 2023-12-15
Payer: COMMERCIAL

## 2023-12-20 ENCOUNTER — APPOINTMENT (OUTPATIENT)
Dept: CARDIAC REHAB | Facility: HOSPITAL | Age: 58
End: 2023-12-20
Payer: COMMERCIAL

## 2023-12-22 ENCOUNTER — APPOINTMENT (OUTPATIENT)
Dept: CARDIAC REHAB | Facility: HOSPITAL | Age: 58
End: 2023-12-22
Payer: COMMERCIAL

## 2023-12-27 ENCOUNTER — APPOINTMENT (OUTPATIENT)
Dept: CARDIAC REHAB | Facility: HOSPITAL | Age: 58
End: 2023-12-27
Payer: COMMERCIAL

## 2023-12-29 ENCOUNTER — TELEPHONE (OUTPATIENT)
Dept: ENDOCRINOLOGY | Facility: CLINIC | Age: 58
End: 2023-12-29

## 2023-12-29 ENCOUNTER — OFFICE VISIT (OUTPATIENT)
Dept: ENDOCRINOLOGY | Facility: CLINIC | Age: 58
End: 2023-12-29
Payer: COMMERCIAL

## 2023-12-29 ENCOUNTER — APPOINTMENT (OUTPATIENT)
Dept: CARDIAC REHAB | Facility: HOSPITAL | Age: 58
End: 2023-12-29
Payer: COMMERCIAL

## 2023-12-29 VITALS
HEIGHT: 74 IN | WEIGHT: 261.3 LBS | SYSTOLIC BLOOD PRESSURE: 165 MMHG | DIASTOLIC BLOOD PRESSURE: 88 MMHG | TEMPERATURE: 97.2 F | RESPIRATION RATE: 16 BRPM | HEART RATE: 69 BPM | BODY MASS INDEX: 33.53 KG/M2

## 2023-12-29 DIAGNOSIS — E11.65 TYPE 2 DIABETES MELLITUS WITH HYPERGLYCEMIA, WITH LONG-TERM CURRENT USE OF INSULIN (MULTI): Primary | ICD-10-CM

## 2023-12-29 DIAGNOSIS — I10 HYPERTENSION, UNSPECIFIED TYPE: ICD-10-CM

## 2023-12-29 DIAGNOSIS — G47.33 OSA (OBSTRUCTIVE SLEEP APNEA): ICD-10-CM

## 2023-12-29 DIAGNOSIS — E11.65 UNCONTROLLED TYPE 2 DIABETES MELLITUS WITH HYPERGLYCEMIA (MULTI): ICD-10-CM

## 2023-12-29 DIAGNOSIS — E11.51 TYPE 2 DIABETES MELLITUS WITH DIABETIC PERIPHERAL ANGIOPATHY WITHOUT GANGRENE, WITH LONG-TERM CURRENT USE OF INSULIN (MULTI): ICD-10-CM

## 2023-12-29 DIAGNOSIS — E66.09 CLASS 1 OBESITY DUE TO EXCESS CALORIES WITH SERIOUS COMORBIDITY AND BODY MASS INDEX (BMI) OF 33.0 TO 33.9 IN ADULT: ICD-10-CM

## 2023-12-29 DIAGNOSIS — Z79.4 TYPE 2 DIABETES MELLITUS WITH HYPERGLYCEMIA, WITH LONG-TERM CURRENT USE OF INSULIN (MULTI): Primary | ICD-10-CM

## 2023-12-29 DIAGNOSIS — E78.5 HYPERLIPIDEMIA LDL GOAL <70: ICD-10-CM

## 2023-12-29 DIAGNOSIS — Z79.4 TYPE 2 DIABETES MELLITUS WITH DIABETIC PERIPHERAL ANGIOPATHY WITHOUT GANGRENE, WITH LONG-TERM CURRENT USE OF INSULIN (MULTI): ICD-10-CM

## 2023-12-29 PROCEDURE — 3008F BODY MASS INDEX DOCD: CPT | Performed by: NURSE PRACTITIONER

## 2023-12-29 PROCEDURE — 3077F SYST BP >= 140 MM HG: CPT | Performed by: NURSE PRACTITIONER

## 2023-12-29 PROCEDURE — 1036F TOBACCO NON-USER: CPT | Performed by: NURSE PRACTITIONER

## 2023-12-29 PROCEDURE — 3079F DIAST BP 80-89 MM HG: CPT | Performed by: NURSE PRACTITIONER

## 2023-12-29 PROCEDURE — 4010F ACE/ARB THERAPY RXD/TAKEN: CPT | Performed by: NURSE PRACTITIONER

## 2023-12-29 PROCEDURE — 99205 OFFICE O/P NEW HI 60 MIN: CPT | Performed by: NURSE PRACTITIONER

## 2023-12-29 ASSESSMENT — ENCOUNTER SYMPTOMS
WEAKNESS: 0
DIZZINESS: 0
PALPITATIONS: 0
CONSTIPATION: 0
SLEEP DISTURBANCE: 0
FREQUENCY: 0
NAUSEA: 0
POLYDIPSIA: 0
SHORTNESS OF BREATH: 0
ACTIVITY CHANGE: 0
APPETITE CHANGE: 0
NUMBNESS: 0
NERVOUS/ANXIOUS: 0
POLYPHAGIA: 0
FATIGUE: 0
SEIZURES: 0
DIARRHEA: 0

## 2023-12-29 ASSESSMENT — PAIN SCALES - GENERAL: PAINLEVEL: 0-NO PAIN

## 2023-12-29 NOTE — PROGRESS NOTES
"Subjective   Victorino Carr is a 58 y.o. male who presents for initial visit for evaluation of Type 2 diabetes mellitus. The initial diagnosis of diabetes was made  age 48 . He works as a  and is moving throughout the day 5 days a week.     Known complications due to diabetes included cardiovascular disease and CAD s/p CABG X5 with pacemaker    Cardiovascular risk factors include advanced age (older than 55 for men, 65 for women), diabetes mellitus, dyslipidemia, hypertension, male gender, and obesity (BMI >= 30 kg/m2). The patient is on an ACE inhibitor or angiotensin II receptor blocker. .     Current diabetes regimen is as follows:   Jardiance 25 mg daily  Lantus 60 Units AM  Glipizide 5 mg daily  Metformin 500 mg AM    The patient is currently checking the blood glucose 4-6 times per day.  Patient is using: continuous glucose monitor: Dexcom. States it is error-ing frequently.     Hypoglycemia frequency: His Dexcom download demonstrates some false hypoglycemia, but he is not truly having any hypoglycemia.   Hypoglycemia awareness: Yes     Exercise: daily   Meal panning: He is using no plan.    Review of Systems   Constitutional:  Negative for activity change, appetite change and fatigue.   Respiratory:  Negative for shortness of breath.    Cardiovascular:  Negative for chest pain, palpitations and leg swelling.   Gastrointestinal:  Negative for constipation, diarrhea and nausea.   Endocrine: Negative for cold intolerance, heat intolerance, polydipsia, polyphagia and polyuria.   Genitourinary:  Negative for frequency.   Musculoskeletal:  Negative for gait problem.   Skin:  Negative for rash.   Neurological:  Negative for dizziness, seizures, weakness and numbness.   Psychiatric/Behavioral:  Negative for sleep disturbance and suicidal ideas. The patient is not nervous/anxious.        Objective   /88   Pulse 69   Temp 36.2 °C (97.2 °F)   Resp 16   Ht 1.88 m (6' 2\")   Wt 119 kg (261 lb 4.8 oz)   " BMI 33.55 kg/m²   Physical Exam  Constitutional:       Appearance: He is obese.   Pulmonary:      Effort: Pulmonary effort is normal.   Skin:     General: Skin is warm and dry.   Neurological:      Mental Status: He is alert and oriented to person, place, and time.   Psychiatric:         Mood and Affect: Mood normal.         Behavior: Behavior normal.         Thought Content: Thought content normal.         Judgment: Judgment normal.       Lab Review  Glucose (mg/dL)   Date Value   07/18/2023 344 (H)   02/28/2022 318 (H)     POC HEMOGLOBIN A1c (%)   Date Value   06/16/2023 11.1 (A)     Hemoglobin A1C (%)   Date Value   10/08/2023 11.3 (H)   04/16/2021 10.4 (H)     Bicarbonate (mmol/L)   Date Value   07/18/2023 22   02/28/2022 24     Urea Nitrogen (mg/dL)   Date Value   07/18/2023 25 (H)   02/28/2022 31 (H)     Creatinine (mg/dL)   Date Value   07/18/2023 1.46 (H)   02/28/2022 1.41 (H)     Health Maintenance:   Foot Exam: None  Eye Exam: States he is seen every 1-2 years. Instructed to tell MD he has diabetes and that he should go yearly.   Lipid Panel: LDL 83 Nov 2023, taking atorvastatin 80 mg daily  Urine Albumin: Pending. Labs ordered.     Assessment/Plan   Diagnoses and all orders for this visit:  Type 2 diabetes mellitus with hyperglycemia, with long-term current use of insulin (CMS/[facility])  Hypertension, unspecified type  Hyperlipidemia LDL goal <70  Class 1 obesity due to excess calories with serious comorbidity and body mass index (BMI) of 33.0 to 33.9 in adult  ROGELIO (obstructive sleep apnea)    Type 2 diabetes mellitus, is not at goal. A1c 11.3 Nov 2023.     RX changes:   CONTINUE Jardiance 25 mg daily  REDUCE Lantus 50 Units AM  START OZEMPIC 0.25 mg weekly for 4 weeks, then increase to 0.5 mg weekly. Sample provided, Copay card provided, Rx sent to local pharmacy. We can also try to send for 90 days.   STOP Glipizide 5 mg daily  CONTINUE Metformin 500 mg AM  CONTINUE DEXCOM G7, wear on sides of your abdomen  rather than arm. Use SKIN TAC to help with adhesive.   ROGELIO: Patient needs full assessment for ROGELIO. Discussed that it will improve blood sugars.  Hypertension: BP elevated at today's visit. Instructed to check BP in AM and report back. Discussed risks of hypertension.   Hyperlipidemia: LDL 83 with goal of <70. Instructed to take statin every single day. He is followed by cardiology due to CABGX5  BMI 33.55: Instructed to eat more fruits and vegetables and reduce processed and packaged foods.   Education:  interpretation of lab results, blood sugar goals, complications of diabetes mellitus, nutrition, and insulin adjustments  Follow up: I recommend diabetes care be with Pharm D at next available and with myself in 4 months.

## 2023-12-29 NOTE — TELEPHONE ENCOUNTER
Prior Auth for Jardiance pending determination  Submitted on 12/29 via epic    KEY: [id number]    Prior Auth for Ozempic 2mg pending determination  Submitted on 12/29 via epic    KEY: [id number]

## 2024-01-03 ENCOUNTER — TELEPHONE (OUTPATIENT)
Dept: ENDOCRINOLOGY | Facility: CLINIC | Age: 59
End: 2024-01-03

## 2024-01-03 ENCOUNTER — PATIENT OUTREACH (OUTPATIENT)
Dept: PRIMARY CARE | Facility: CLINIC | Age: 59
End: 2024-01-03

## 2024-01-03 DIAGNOSIS — Z79.4 TYPE 2 DIABETES MELLITUS WITH HYPERGLYCEMIA, WITH LONG-TERM CURRENT USE OF INSULIN (MULTI): ICD-10-CM

## 2024-01-03 DIAGNOSIS — E78.5 HYPERLIPIDEMIA LDL GOAL <70: ICD-10-CM

## 2024-01-03 DIAGNOSIS — Z79.4 TYPE 2 DIABETES MELLITUS WITH DIABETIC PERIPHERAL ANGIOPATHY WITHOUT GANGRENE, WITH LONG-TERM CURRENT USE OF INSULIN (MULTI): ICD-10-CM

## 2024-01-03 DIAGNOSIS — F41.9 ANXIETY: ICD-10-CM

## 2024-01-03 DIAGNOSIS — E11.9 TYPE 2 DIABETES MELLITUS WITHOUT COMPLICATION, WITH LONG-TERM CURRENT USE OF INSULIN (MULTI): ICD-10-CM

## 2024-01-03 DIAGNOSIS — Z79.4 TYPE 2 DIABETES MELLITUS WITHOUT COMPLICATION, WITH LONG-TERM CURRENT USE OF INSULIN (MULTI): ICD-10-CM

## 2024-01-03 DIAGNOSIS — E11.65 UNCONTROLLED TYPE 2 DIABETES MELLITUS WITH HYPERGLYCEMIA (MULTI): ICD-10-CM

## 2024-01-03 DIAGNOSIS — E11.65 TYPE 2 DIABETES MELLITUS WITH HYPERGLYCEMIA, WITH LONG-TERM CURRENT USE OF INSULIN (MULTI): ICD-10-CM

## 2024-01-03 DIAGNOSIS — I10 PRIMARY HYPERTENSION: ICD-10-CM

## 2024-01-03 DIAGNOSIS — E11.51 TYPE 2 DIABETES MELLITUS WITH DIABETIC PERIPHERAL ANGIOPATHY WITHOUT GANGRENE, WITH LONG-TERM CURRENT USE OF INSULIN (MULTI): ICD-10-CM

## 2024-01-03 RX ORDER — INSULIN GLARGINE 100 [IU]/ML
60 INJECTION, SOLUTION SUBCUTANEOUS DAILY
Qty: 60 ML | Refills: 3 | Status: SHIPPED
Start: 2024-01-03 | End: 2024-01-08 | Stop reason: SDUPTHER

## 2024-01-03 RX ORDER — ESCITALOPRAM OXALATE 10 MG/1
10 TABLET ORAL DAILY
Qty: 90 TABLET | Refills: 3 | Status: SHIPPED | OUTPATIENT
Start: 2024-01-03 | End: 2025-01-02

## 2024-01-03 RX ORDER — CARVEDILOL 12.5 MG/1
6.25 TABLET ORAL
Qty: 90 TABLET | Refills: 3 | Status: SHIPPED | OUTPATIENT
Start: 2024-01-03 | End: 2025-01-02

## 2024-01-03 RX ORDER — GLIPIZIDE 5 MG/1
2.5 TABLET ORAL DAILY
Qty: 45 TABLET | Refills: 3 | Status: SHIPPED | OUTPATIENT
Start: 2024-01-03

## 2024-01-03 RX ORDER — ATORVASTATIN CALCIUM 80 MG/1
80 TABLET, FILM COATED ORAL DAILY
Qty: 90 TABLET | Refills: 3 | Status: SHIPPED | OUTPATIENT
Start: 2024-01-03

## 2024-01-03 NOTE — TELEPHONE ENCOUNTER
Rx Refill Request Telephone Encounter    Name:  Victorino Carr  :  [id number]  Medication Name:    empagliflozin (Jardiance) 25 mg   semaglutide 0.25 mg or 0.5 mg (2 mg/3 mL) pen injector   insulin glargine (Lantus Solostar U-100 Insulin) 100 unit/mL (3 mL) pen   escitalopram (Lexapro) 10 mg tablet   carvedilol (Coreg) 12.5 mg tablet   atorvastatin (Lipitor) 80 mg tablet   glipiZIDE (Glucotrol) 5 mg tablet     Specific Pharmacy location:  [facility] rx pharmacy mail order, [address], [facility]  Date of last appointment:  na  Date of next appointment:  na  Best number to reach patient:  [phone]      **Pt is requesting 90 day supplies on all scripts.

## 2024-01-03 NOTE — TELEPHONE ENCOUNTER
[facility] asked that you resend Rx for G7. Sig states use 1 every 14 days and then says replace every 10 day. Please upate sig and resend.

## 2024-01-04 ENCOUNTER — APPOINTMENT (OUTPATIENT)
Dept: CARDIOLOGY | Facility: HOSPITAL | Age: 59
End: 2024-01-04
Payer: COMMERCIAL

## 2024-01-04 ENCOUNTER — APPOINTMENT (OUTPATIENT)
Dept: RADIOLOGY | Facility: HOSPITAL | Age: 59
End: 2024-01-04
Payer: COMMERCIAL

## 2024-01-04 ENCOUNTER — HOSPITAL ENCOUNTER (EMERGENCY)
Facility: HOSPITAL | Age: 59
Discharge: HOME | End: 2024-01-04
Attending: STUDENT IN AN ORGANIZED HEALTH CARE EDUCATION/TRAINING PROGRAM
Payer: COMMERCIAL

## 2024-01-04 VITALS
RESPIRATION RATE: 16 BRPM | HEART RATE: 62 BPM | WEIGHT: 262 LBS | OXYGEN SATURATION: 98 % | HEIGHT: 74 IN | SYSTOLIC BLOOD PRESSURE: 164 MMHG | BODY MASS INDEX: 33.62 KG/M2 | TEMPERATURE: 97.5 F | DIASTOLIC BLOOD PRESSURE: 93 MMHG

## 2024-01-04 DIAGNOSIS — R10.9 ABDOMINAL PAIN, UNSPECIFIED ABDOMINAL LOCATION: Primary | ICD-10-CM

## 2024-01-04 LAB
ALBUMIN SERPL BCP-MCNC: 4.4 G/DL (ref 3.4–5)
ALP SERPL-CCNC: 85 U/L (ref 33–136)
ALT SERPL W P-5'-P-CCNC: 21 U/L (ref 10–52)
ANION GAP SERPL CALC-SCNC: 10 MMOL/L (ref 10–20)
AST SERPL W P-5'-P-CCNC: 16 U/L (ref 9–39)
ATRIAL RATE: 60 BPM
BASOPHILS # BLD AUTO: 0.04 X10*3/UL (ref 0–0.1)
BASOPHILS NFR BLD AUTO: 0.4 %
BILIRUB SERPL-MCNC: 1.7 MG/DL (ref 0–1.2)
BUN SERPL-MCNC: 21 MG/DL (ref 6–23)
CALCIUM SERPL-MCNC: 9.7 MG/DL (ref 8.6–10.3)
CARDIAC TROPONIN I PNL SERPL HS: 11 NG/L (ref 0–20)
CHLORIDE SERPL-SCNC: 104 MMOL/L (ref 98–107)
CO2 SERPL-SCNC: 29 MMOL/L (ref 21–32)
CREAT SERPL-MCNC: 1.25 MG/DL (ref 0.5–1.3)
D DIMER PPP FEU-MCNC: 255 NG/ML FEU
EOSINOPHIL # BLD AUTO: 0.22 X10*3/UL (ref 0–0.7)
EOSINOPHIL NFR BLD AUTO: 2.3 %
ERYTHROCYTE [DISTWIDTH] IN BLOOD BY AUTOMATED COUNT: 12.8 % (ref 11.5–14.5)
FLUAV RNA RESP QL NAA+PROBE: NOT DETECTED
FLUBV RNA RESP QL NAA+PROBE: NOT DETECTED
GFR SERPL CREATININE-BSD FRML MDRD: 66 ML/MIN/1.73M*2
GLUCOSE SERPL-MCNC: 200 MG/DL (ref 74–99)
HCT VFR BLD AUTO: 52.4 % (ref 41–52)
HGB BLD-MCNC: 17.2 G/DL (ref 13.5–17.5)
IMM GRANULOCYTES # BLD AUTO: 0.04 X10*3/UL (ref 0–0.7)
IMM GRANULOCYTES NFR BLD AUTO: 0.4 % (ref 0–0.9)
LACTATE SERPL-SCNC: 1.1 MMOL/L (ref 0.4–2)
LIPASE SERPL-CCNC: 41 U/L (ref 9–82)
LYMPHOCYTES # BLD AUTO: 1.91 X10*3/UL (ref 1.2–4.8)
LYMPHOCYTES NFR BLD AUTO: 19.7 %
MAGNESIUM SERPL-MCNC: 1.9 MG/DL (ref 1.6–2.4)
MCH RBC QN AUTO: 29.3 PG (ref 26–34)
MCHC RBC AUTO-ENTMCNC: 32.8 G/DL (ref 32–36)
MCV RBC AUTO: 89 FL (ref 80–100)
MONOCYTES # BLD AUTO: 0.59 X10*3/UL (ref 0.1–1)
MONOCYTES NFR BLD AUTO: 6.1 %
NEUTROPHILS # BLD AUTO: 6.91 X10*3/UL (ref 1.2–7.7)
NEUTROPHILS NFR BLD AUTO: 71.1 %
NRBC BLD-RTO: 0 /100 WBCS (ref 0–0)
P AXIS: 25 DEGREES
P OFFSET: 194 MS
P ONSET: 138 MS
PLATELET # BLD AUTO: 312 X10*3/UL (ref 150–450)
POTASSIUM SERPL-SCNC: 5.3 MMOL/L (ref 3.5–5.3)
PR INTERVAL: 146 MS
PROT SERPL-MCNC: 7.8 G/DL (ref 6.4–8.2)
Q ONSET: 211 MS
QRS COUNT: 10 BEATS
QRS DURATION: 96 MS
QT INTERVAL: 428 MS
QTC CALCULATION(BAZETT): 428 MS
QTC FREDERICIA: 428 MS
R AXIS: -86 DEGREES
RBC # BLD AUTO: 5.88 X10*6/UL (ref 4.5–5.9)
RSV RNA RESP QL NAA+PROBE: NOT DETECTED
SARS-COV-2 RNA RESP QL NAA+PROBE: NOT DETECTED
SODIUM SERPL-SCNC: 138 MMOL/L (ref 136–145)
T AXIS: 67 DEGREES
T OFFSET: 425 MS
VENTRICULAR RATE: 60 BPM
WBC # BLD AUTO: 9.7 X10*3/UL (ref 4.4–11.3)

## 2024-01-04 PROCEDURE — 99285 EMERGENCY DEPT VISIT HI MDM: CPT | Mod: 25

## 2024-01-04 PROCEDURE — 99284 EMERGENCY DEPT VISIT MOD MDM: CPT | Performed by: STUDENT IN AN ORGANIZED HEALTH CARE EDUCATION/TRAINING PROGRAM

## 2024-01-04 PROCEDURE — 2550000001 HC RX 255 CONTRASTS

## 2024-01-04 PROCEDURE — 85025 COMPLETE CBC W/AUTO DIFF WBC: CPT

## 2024-01-04 PROCEDURE — 83605 ASSAY OF LACTIC ACID: CPT

## 2024-01-04 PROCEDURE — 71046 X-RAY EXAM CHEST 2 VIEWS: CPT | Mod: FR

## 2024-01-04 PROCEDURE — 93005 ELECTROCARDIOGRAM TRACING: CPT

## 2024-01-04 PROCEDURE — 87637 SARSCOV2&INF A&B&RSV AMP PRB: CPT

## 2024-01-04 PROCEDURE — 36415 COLL VENOUS BLD VENIPUNCTURE: CPT

## 2024-01-04 PROCEDURE — 71046 X-RAY EXAM CHEST 2 VIEWS: CPT | Mod: FOREIGN READ | Performed by: RADIOLOGY

## 2024-01-04 PROCEDURE — 74177 CT ABD & PELVIS W/CONTRAST: CPT | Performed by: RADIOLOGY

## 2024-01-04 PROCEDURE — 74177 CT ABD & PELVIS W/CONTRAST: CPT

## 2024-01-04 PROCEDURE — 83690 ASSAY OF LIPASE: CPT

## 2024-01-04 PROCEDURE — 85379 FIBRIN DEGRADATION QUANT: CPT

## 2024-01-04 PROCEDURE — 84484 ASSAY OF TROPONIN QUANT: CPT

## 2024-01-04 PROCEDURE — 80053 COMPREHEN METABOLIC PANEL: CPT

## 2024-01-04 PROCEDURE — 2500000005 HC RX 250 GENERAL PHARMACY W/O HCPCS: Performed by: STUDENT IN AN ORGANIZED HEALTH CARE EDUCATION/TRAINING PROGRAM

## 2024-01-04 PROCEDURE — 83735 ASSAY OF MAGNESIUM: CPT

## 2024-01-04 RX ORDER — LIDOCAINE 560 MG/1
1 PATCH PERCUTANEOUS; TOPICAL; TRANSDERMAL ONCE
Status: DISCONTINUED | OUTPATIENT
Start: 2024-01-04 | End: 2024-01-05 | Stop reason: HOSPADM

## 2024-01-04 RX ORDER — ACETAMINOPHEN 325 MG/1
975 TABLET ORAL ONCE
Status: COMPLETED | OUTPATIENT
Start: 2024-01-04 | End: 2024-01-04

## 2024-01-04 RX ORDER — ORPHENADRINE CITRATE 30 MG/ML
60 INJECTION INTRAMUSCULAR; INTRAVENOUS ONCE
Status: DISCONTINUED | OUTPATIENT
Start: 2024-01-04 | End: 2024-01-05 | Stop reason: HOSPADM

## 2024-01-04 RX ORDER — KETOROLAC TROMETHAMINE 30 MG/ML
15 INJECTION, SOLUTION INTRAMUSCULAR; INTRAVENOUS ONCE
Status: DISCONTINUED | OUTPATIENT
Start: 2024-01-04 | End: 2024-01-04

## 2024-01-04 RX ORDER — ORPHENADRINE CITRATE 100 MG/1
100 TABLET, EXTENDED RELEASE ORAL 2 TIMES DAILY PRN
Qty: 20 TABLET | Refills: 0 | Status: SHIPPED | OUTPATIENT
Start: 2024-01-04 | End: 2024-01-14

## 2024-01-04 RX ORDER — LIDOCAINE 50 MG/G
1 PATCH TOPICAL DAILY
Qty: 7 PATCH | Refills: 0 | Status: SHIPPED | OUTPATIENT
Start: 2024-01-04

## 2024-01-04 RX ORDER — ACETAMINOPHEN 500 MG
1000 TABLET ORAL EVERY 6 HOURS PRN
Qty: 30 TABLET | Refills: 0 | Status: SHIPPED | OUTPATIENT
Start: 2024-01-04 | End: 2024-01-14

## 2024-01-04 RX ADMIN — ACETAMINOPHEN 975 MG: 325 TABLET ORAL at 22:08

## 2024-01-04 RX ADMIN — LIDOCAINE 1 PATCH: 4 PATCH TOPICAL at 22:08

## 2024-01-04 RX ADMIN — IOHEXOL 75 ML: 350 INJECTION, SOLUTION INTRAVENOUS at 17:01

## 2024-01-04 ASSESSMENT — COLUMBIA-SUICIDE SEVERITY RATING SCALE - C-SSRS
1. IN THE PAST MONTH, HAVE YOU WISHED YOU WERE DEAD OR WISHED YOU COULD GO TO SLEEP AND NOT WAKE UP?: NO
6. HAVE YOU EVER DONE ANYTHING, STARTED TO DO ANYTHING, OR PREPARED TO DO ANYTHING TO END YOUR LIFE?: NO
2. HAVE YOU ACTUALLY HAD ANY THOUGHTS OF KILLING YOURSELF?: NO

## 2024-01-04 ASSESSMENT — PAIN DESCRIPTION - DESCRIPTORS
DESCRIPTORS: SORE
DESCRIPTORS: SORE
DESCRIPTORS: ACHING

## 2024-01-04 ASSESSMENT — PAIN SCALES - GENERAL
PAINLEVEL_OUTOF10: 0 - NO PAIN
PAINLEVEL_OUTOF10: 3
PAINLEVEL_OUTOF10: 3

## 2024-01-04 ASSESSMENT — PAIN DESCRIPTION - ORIENTATION
ORIENTATION: RIGHT
ORIENTATION: MID

## 2024-01-04 ASSESSMENT — PAIN - FUNCTIONAL ASSESSMENT
PAIN_FUNCTIONAL_ASSESSMENT: 0-10
PAIN_FUNCTIONAL_ASSESSMENT: 0-10

## 2024-01-04 ASSESSMENT — PAIN DESCRIPTION - LOCATION
LOCATION: ABDOMEN
LOCATION: ABDOMEN

## 2024-01-04 ASSESSMENT — PAIN DESCRIPTION - FREQUENCY: FREQUENCY: CONSTANT/CONTINUOUS

## 2024-01-04 ASSESSMENT — PAIN DESCRIPTION - ONSET: ONSET: GRADUAL

## 2024-01-04 ASSESSMENT — PAIN DESCRIPTION - PROGRESSION: CLINICAL_PROGRESSION: GRADUALLY WORSENING

## 2024-01-04 ASSESSMENT — PAIN DESCRIPTION - PAIN TYPE: TYPE: VISCERAL PAIN

## 2024-01-04 NOTE — ED TRIAGE NOTES
Pt presents to ED c/o body aches, vomiting, mild cough and SOB on exertion x2 days. Denies fevers, abdominal pain, CP.

## 2024-01-04 NOTE — LETTER
January 4, 2024    Patient: Victorino Carr   YOB: 1965   Date of Visit: 1/4/2024       To Whom It May Concern:    Victorino Carr was seen and treated in our emergency department on 1/4/2024. He  may return to work on Monday 1/8/2024 .    If you have any questions or concerns, please don't hesitate to call.              Isabel Garcia RN

## 2024-01-04 NOTE — ED TRIAGE NOTES
The patient was seen and examined in triage.     History of Present Illness: The patient is a 58-year-old male with a past medical history of quadruple bypass presenting to emergency room with generalized bodyaches, nausea, nonbloody nonbilious emesis and mild cough x 2 days.  Patient does state that his grandson has influenza.  Patient states that he has not taken his temperature, but he has felt feverish.  Denies any abdominal pain, admits to several episodes of diarrhea.  He has not been able to eat or drink much in the past couple days as he will vomit it up.  Also admits to mild shortness of breath on exertion, which appears to be his baseline.  No recent travel, history of DVT/PE, unilateral leg swelling, hemoptysis, chest pain.    Brief Physical Exam: Exam is limited by the patient sitting in a chair in triage.  Heart: Regular rate and rhythm.   Lungs: Clear to auscultation bilaterally.   Abdomen: Soft, nondistended, normoactive bowel sounds. Tenderness to palpation of RUQ.     Plan: Appropriate labs and diagnostic imaging were ordered.     For the remainder of the patient's workup and ED course, please refer to the main ED provider note. We discussed need for diagnostic testing including laboratory studies and imaging.  We also discussed that they may be asked to wait in the waiting room while these tests are pending.  They understand that if they choose to leave without having the testing completed or resulted that we cannot rule out acute life threatening illnesses and the risks involved could lead to worsening condition, permanent disability or even death.      Disclaimer: This note was dictated by speech recognition. Minor errors in transcription may be present. Please call if questions.

## 2024-01-05 NOTE — ED PROVIDER NOTES
HPI  Patient is a 58-year-old male with PMH of CAD s/p PPM and CABG x 4 presented to the emergency department for abdominal pain and generalized myalgias.  Reports this been going on for about 3 to 4 weeks.  Was exposed to influenza over the holidays.  Denies any significant GI symptoms, subjective fevers, chest pain, or shortness of breath at this time.    Physical Exam  VITALS: Vital signs reviewed in nursing triage note, EMR flow sheets, and at patient's bedside  CONSTITUTIONAL: Well-appearing, in no apparent distress  HEAD: NCAT  EYES: PERRL, EOMI  NECK: Supple, non-tender, full ROM  CARD: RRR, no m/r/g, no JVD  RESP: LCTAB, speaking full sentences, no increased work of breathing, no use of accessory respiratory muscles  ABD: Bowel sounds present, non-distended, soft and non-tender, no palpable organomegaly, no masses, no guarding or rebound tenderness  BACK: No vertebral point or CVA tenderness, no obvious bony deformities, no spinal step-offs   EXT: Normal ROM in all four extremities, 2+ radial and DP pulses bilaterally, no edema  SKIN: Dry with appropriate turgor, no apparent rashes, suspicious lesions, or masses   NEURO: CNs II-XII grossly intact, AAOx4, sensation intact bilaterally, strength 5/5 on UEs and LEs bilaterally, speech is fluent and comprehensible  PSYCH: Appropriate mood and affect, no HI/SI, not responding to internal stimuli    Vitals:    01/04/24 2100   BP: 163/85   Pulse: 62   Resp: 19   Temp:    SpO2: 96%       Assessment/Plan/MDM  Patient clinical stable with normal vital signs upon presentation to the emergency department.  Physical exam is reassuring patient no evidence of tenderness of McBurney's point or Dalton sign that would be concerning for appendicitis or cholecystitis, respectively.  While in triage patient received full laboratory workup including CBC, CMP, magnesium, D-dimer, troponin, lactate, lipase, and viral swabs.  Laboratory work is largely unremarkable.  Urine was  ordered, however patient does not have any dysuria or hematuria at this time, therefore do not feel that it is necessary.  Patient additionally underwent CT imaging as well as CXR which did not demonstrate any significant intra-abdominal or cardiothoracic pathologies, respectively.  Was much improved upon my assessment.  Provided with Tylenol, lidocaine patch, Norflex for symptomatic control will be discharged with these medications.  Patient amenable to this plan.  Discharged stable condition.    Results  *See section(s) entitled ``Lab Results´´, ``Diagnostic Imaging Results Review´´ for entirety.  Notable results listed below  - Labs: I independently interpreted as laboratory workup unremarkable  - Images: I independently interpreted as CXR shows no acute cardiopulmonary process, CT A/P demonstrates no intra-abdominal process    Diagnoses as of 01/04/24 2153   Abdominal pain, unspecified abdominal location      Clinical Impression  Abdominal pain    Dispo  Discharge home    Home: I discussed the differential, results and discharge plan with the patient and/or family/friend/caregiver if present. I emphasized the importance of follow-up with the physician I referred them to in the timeframe recommended. I explained reasons for the patient to return to the Emergency Department. Questions were addressed. They understand return precautions and discharge instructions. The patient and/or family/friend/caregiver expressed understanding and agreement with assessment/plan.     Patient seen and discussed with attending physician Dr. Valladares.    Marito Pearson MD  Emergency Medicine, PGY-3    Was dictated using Dragon dictation. Please excuse any errors found in the note.     Marito Pearson MD  Resident  01/04/24 [id number]

## 2024-01-08 ENCOUNTER — TELEPHONE (OUTPATIENT)
Dept: PRIMARY CARE | Facility: CLINIC | Age: 59
End: 2024-01-08

## 2024-01-08 DIAGNOSIS — E11.51 TYPE 2 DIABETES MELLITUS WITH DIABETIC PERIPHERAL ANGIOPATHY WITHOUT GANGRENE, WITH LONG-TERM CURRENT USE OF INSULIN (MULTI): Primary | ICD-10-CM

## 2024-01-08 DIAGNOSIS — Z79.4 TYPE 2 DIABETES MELLITUS WITH DIABETIC PERIPHERAL ANGIOPATHY WITHOUT GANGRENE, WITH LONG-TERM CURRENT USE OF INSULIN (MULTI): Primary | ICD-10-CM

## 2024-01-08 RX ORDER — INSULIN GLARGINE-YFGN 100 [IU]/ML
60 INJECTION, SOLUTION SUBCUTANEOUS EVERY 24 HOURS
Qty: 15 ML | Refills: 12 | Status: SHIPPED | OUTPATIENT
Start: 2024-01-08 | End: 2025-01-07

## 2024-01-08 NOTE — TELEPHONE ENCOUNTER
Lantus is no longer on formulary. Preferred alternatives are Semglee or Toujeo. Do you want to change the medication or should I start a PA?

## 2024-01-26 ENCOUNTER — APPOINTMENT (OUTPATIENT)
Dept: PRIMARY CARE | Facility: CLINIC | Age: 59
End: 2024-01-26

## 2024-02-06 ENCOUNTER — APPOINTMENT (OUTPATIENT)
Dept: ENDOCRINOLOGY | Facility: CLINIC | Age: 59
End: 2024-02-06

## 2024-02-22 ENCOUNTER — HOSPITAL ENCOUNTER (OUTPATIENT)
Dept: CARDIOLOGY | Facility: CLINIC | Age: 59
Discharge: HOME | End: 2024-02-22

## 2024-02-22 ENCOUNTER — INPATIENT HOSPITAL (OUTPATIENT)
Dept: URBAN - METROPOLITAN AREA HOSPITAL 50 | Facility: HOSPITAL | Age: 59
End: 2024-02-22

## 2024-02-22 DIAGNOSIS — R10.11 RIGHT UPPER QUADRANT PAIN: ICD-10-CM

## 2024-02-22 DIAGNOSIS — I49.5 SSS (SICK SINUS SYNDROME) (MULTI): ICD-10-CM

## 2024-02-22 DIAGNOSIS — R74.01 ELEVATION OF LEVELS OF LIVER TRANSAMINASE LEVELS: ICD-10-CM

## 2024-02-22 DIAGNOSIS — Z95.0 PRESENCE OF PERMANENT CARDIAC PACEMAKER: ICD-10-CM

## 2024-02-22 DIAGNOSIS — R93.2 ABNORMAL FINDINGS ON DIAGNOSTIC IMAGING OF LIVER AND BILIARY: ICD-10-CM

## 2024-02-22 DIAGNOSIS — R11.2 NAUSEA WITH VOMITING, UNSPECIFIED: ICD-10-CM

## 2024-02-22 PROCEDURE — 99254 IP/OBS CNSLTJ NEW/EST MOD 60: CPT | Performed by: INTERNAL MEDICINE

## 2024-02-23 ENCOUNTER — INPATIENT HOSPITAL (OUTPATIENT)
Dept: URBAN - METROPOLITAN AREA HOSPITAL 50 | Facility: HOSPITAL | Age: 59
End: 2024-02-23

## 2024-02-23 DIAGNOSIS — R13.10 DYSPHAGIA, UNSPECIFIED: ICD-10-CM

## 2024-02-23 DIAGNOSIS — R74.01 ELEVATION OF LEVELS OF LIVER TRANSAMINASE LEVELS: ICD-10-CM

## 2024-02-23 DIAGNOSIS — R11.2 NAUSEA WITH VOMITING, UNSPECIFIED: ICD-10-CM

## 2024-02-23 DIAGNOSIS — R10.11 RIGHT UPPER QUADRANT PAIN: ICD-10-CM

## 2024-02-23 DIAGNOSIS — R93.2 ABNORMAL FINDINGS ON DIAGNOSTIC IMAGING OF LIVER AND BILIARY: ICD-10-CM

## 2024-02-23 PROCEDURE — 99233 SBSQ HOSP IP/OBS HIGH 50: CPT | Performed by: NURSE PRACTITIONER

## 2024-02-24 ENCOUNTER — INPATIENT HOSPITAL (OUTPATIENT)
Dept: URBAN - METROPOLITAN AREA HOSPITAL 50 | Facility: HOSPITAL | Age: 59
End: 2024-02-24
Payer: COMMERCIAL

## 2024-02-24 DIAGNOSIS — K31.89 OTHER DISEASES OF STOMACH AND DUODENUM: ICD-10-CM

## 2024-02-24 DIAGNOSIS — R13.10 DYSPHAGIA, UNSPECIFIED: ICD-10-CM

## 2024-02-24 PROCEDURE — 43239 EGD BIOPSY SINGLE/MULTIPLE: CPT | Performed by: INTERNAL MEDICINE

## 2024-02-24 PROCEDURE — 43450 DILATE ESOPHAGUS 1/MULT PASS: CPT | Performed by: INTERNAL MEDICINE

## 2024-02-25 ENCOUNTER — INPATIENT HOSPITAL (OUTPATIENT)
Dept: URBAN - METROPOLITAN AREA HOSPITAL 50 | Facility: HOSPITAL | Age: 59
End: 2024-02-25
Payer: COMMERCIAL

## 2024-02-25 DIAGNOSIS — K31.89 OTHER DISEASES OF STOMACH AND DUODENUM: ICD-10-CM

## 2024-02-25 DIAGNOSIS — R93.2 ABNORMAL FINDINGS ON DIAGNOSTIC IMAGING OF LIVER AND BILIARY: ICD-10-CM

## 2024-02-25 DIAGNOSIS — R13.10 DYSPHAGIA, UNSPECIFIED: ICD-10-CM

## 2024-02-25 PROCEDURE — 99233 SBSQ HOSP IP/OBS HIGH 50: CPT | Performed by: NURSE PRACTITIONER

## 2024-02-26 ENCOUNTER — APPOINTMENT (OUTPATIENT)
Dept: ENDOCRINOLOGY | Facility: CLINIC | Age: 59
End: 2024-02-26

## 2024-02-26 ENCOUNTER — INPATIENT HOSPITAL (OUTPATIENT)
Dept: URBAN - METROPOLITAN AREA HOSPITAL 50 | Facility: HOSPITAL | Age: 59
End: 2024-02-26
Payer: COMMERCIAL

## 2024-02-26 DIAGNOSIS — R13.10 DYSPHAGIA, UNSPECIFIED: ICD-10-CM

## 2024-02-26 DIAGNOSIS — K31.89 OTHER DISEASES OF STOMACH AND DUODENUM: ICD-10-CM

## 2024-02-26 DIAGNOSIS — R93.2 ABNORMAL FINDINGS ON DIAGNOSTIC IMAGING OF LIVER AND BILIARY: ICD-10-CM

## 2024-02-26 PROCEDURE — 99233 SBSQ HOSP IP/OBS HIGH 50: CPT | Performed by: NURSE PRACTITIONER

## 2024-02-27 ENCOUNTER — INPATIENT HOSPITAL (OUTPATIENT)
Dept: URBAN - METROPOLITAN AREA HOSPITAL 50 | Facility: HOSPITAL | Age: 59
End: 2024-02-27
Payer: COMMERCIAL

## 2024-02-27 DIAGNOSIS — R93.2 ABNORMAL FINDINGS ON DIAGNOSTIC IMAGING OF LIVER AND BILIARY: ICD-10-CM

## 2024-02-27 DIAGNOSIS — R13.10 DYSPHAGIA, UNSPECIFIED: ICD-10-CM

## 2024-02-27 DIAGNOSIS — K31.89 OTHER DISEASES OF STOMACH AND DUODENUM: ICD-10-CM

## 2024-02-27 PROCEDURE — 99233 SBSQ HOSP IP/OBS HIGH 50: CPT | Performed by: INTERNAL MEDICINE

## 2024-02-28 ENCOUNTER — PATIENT OUTREACH (OUTPATIENT)
Dept: CARE COORDINATION | Facility: CLINIC | Age: 59
End: 2024-02-28

## 2024-02-28 NOTE — PROGRESS NOTES
Discharge Facility: [facility]  Discharge Diagnosis: Cholecystitis  Admission Date: 2/20/2024  Discharge Date: 2/27/2024    PCP Appointment Date:  -Pt reports d/t insurance change he will be switching to a [facility] PCP.    Hospital Encounter and Summary: Linked     Pt dis-enrolled from TCM program at this time.

## 2024-03-08 ENCOUNTER — APPOINTMENT (OUTPATIENT)
Dept: PRIMARY CARE | Facility: CLINIC | Age: 59
End: 2024-03-08

## 2024-12-09 DIAGNOSIS — E11.51 TYPE 2 DIABETES MELLITUS WITH DIABETIC PERIPHERAL ANGIOPATHY WITHOUT GANGRENE, WITH LONG-TERM CURRENT USE OF INSULIN (MULTI): Primary | ICD-10-CM

## 2024-12-09 DIAGNOSIS — Z79.4 TYPE 2 DIABETES MELLITUS WITH DIABETIC PERIPHERAL ANGIOPATHY WITHOUT GANGRENE, WITH LONG-TERM CURRENT USE OF INSULIN (MULTI): Primary | ICD-10-CM

## 2024-12-09 RX ORDER — BLOOD-GLUCOSE SENSOR
EACH MISCELLANEOUS
Qty: 9 EACH | Refills: 3 | Status: SHIPPED | OUTPATIENT
Start: 2024-12-09

## 2025-03-13 ENCOUNTER — HOSPITAL ENCOUNTER (OUTPATIENT)
Facility: HOSPITAL | Age: 60
Setting detail: OBSERVATION
LOS: 1 days | Discharge: HOME | End: 2025-03-14
Attending: STUDENT IN AN ORGANIZED HEALTH CARE EDUCATION/TRAINING PROGRAM | Admitting: INTERNAL MEDICINE
Payer: COMMERCIAL

## 2025-03-13 ENCOUNTER — APPOINTMENT (OUTPATIENT)
Dept: CARDIOLOGY | Facility: HOSPITAL | Age: 60
End: 2025-03-13
Payer: COMMERCIAL

## 2025-03-13 ENCOUNTER — APPOINTMENT (OUTPATIENT)
Dept: RADIOLOGY | Facility: HOSPITAL | Age: 60
End: 2025-03-13
Payer: COMMERCIAL

## 2025-03-13 DIAGNOSIS — R07.9 CHEST PAIN, UNSPECIFIED TYPE: Primary | ICD-10-CM

## 2025-03-13 DIAGNOSIS — I10 PRIMARY HYPERTENSION: ICD-10-CM

## 2025-03-13 DIAGNOSIS — E11.65 UNCONTROLLED TYPE 2 DIABETES MELLITUS WITH HYPERGLYCEMIA: ICD-10-CM

## 2025-03-13 LAB
ALBUMIN SERPL BCP-MCNC: 3.9 G/DL (ref 3.4–5)
ALP SERPL-CCNC: 88 U/L (ref 33–136)
ALT SERPL W P-5'-P-CCNC: 16 U/L (ref 10–52)
ANION GAP SERPL CALC-SCNC: 14 MMOL/L (ref 10–20)
AST SERPL W P-5'-P-CCNC: 14 U/L (ref 9–39)
BASOPHILS # BLD AUTO: 0.05 X10*3/UL (ref 0–0.1)
BASOPHILS NFR BLD AUTO: 0.4 %
BILIRUB SERPL-MCNC: 1.1 MG/DL (ref 0–1.2)
BNP SERPL-MCNC: 70 PG/ML (ref 0–99)
BUN SERPL-MCNC: 20 MG/DL (ref 6–23)
CALCIUM SERPL-MCNC: 9.1 MG/DL (ref 8.6–10.3)
CARDIAC TROPONIN I PNL SERPL HS: 13 NG/L (ref 0–20)
CARDIAC TROPONIN I PNL SERPL HS: 13 NG/L (ref 0–20)
CHLORIDE SERPL-SCNC: 105 MMOL/L (ref 98–107)
CO2 SERPL-SCNC: 23 MMOL/L (ref 21–32)
CREAT SERPL-MCNC: 1.31 MG/DL (ref 0.5–1.3)
EGFRCR SERPLBLD CKD-EPI 2021: 62 ML/MIN/1.73M*2
EOSINOPHIL # BLD AUTO: 0.24 X10*3/UL (ref 0–0.7)
EOSINOPHIL NFR BLD AUTO: 2.1 %
ERYTHROCYTE [DISTWIDTH] IN BLOOD BY AUTOMATED COUNT: 13.2 % (ref 11.5–14.5)
GLUCOSE SERPL-MCNC: 174 MG/DL (ref 74–99)
HCT VFR BLD AUTO: 46.8 % (ref 41–52)
HGB BLD-MCNC: 14.8 G/DL (ref 13.5–17.5)
IMM GRANULOCYTES # BLD AUTO: 0.05 X10*3/UL (ref 0–0.7)
IMM GRANULOCYTES NFR BLD AUTO: 0.4 % (ref 0–0.9)
LYMPHOCYTES # BLD AUTO: 2.64 X10*3/UL (ref 1.2–4.8)
LYMPHOCYTES NFR BLD AUTO: 23 %
MAGNESIUM SERPL-MCNC: 1.92 MG/DL (ref 1.6–2.4)
MCH RBC QN AUTO: 28.2 PG (ref 26–34)
MCHC RBC AUTO-ENTMCNC: 31.6 G/DL (ref 32–36)
MCV RBC AUTO: 89 FL (ref 80–100)
MONOCYTES # BLD AUTO: 0.79 X10*3/UL (ref 0.1–1)
MONOCYTES NFR BLD AUTO: 6.9 %
NEUTROPHILS # BLD AUTO: 7.73 X10*3/UL (ref 1.2–7.7)
NEUTROPHILS NFR BLD AUTO: 67.2 %
NRBC BLD-RTO: 0 /100 WBCS (ref 0–0)
PLATELET # BLD AUTO: 278 X10*3/UL (ref 150–450)
POTASSIUM SERPL-SCNC: 4.3 MMOL/L (ref 3.5–5.3)
PROT SERPL-MCNC: 6.9 G/DL (ref 6.4–8.2)
RBC # BLD AUTO: 5.25 X10*6/UL (ref 4.5–5.9)
SODIUM SERPL-SCNC: 138 MMOL/L (ref 136–145)
WBC # BLD AUTO: 11.5 X10*3/UL (ref 4.4–11.3)

## 2025-03-13 PROCEDURE — 71045 X-RAY EXAM CHEST 1 VIEW: CPT | Mod: FOREIGN READ | Performed by: RADIOLOGY

## 2025-03-13 PROCEDURE — 83735 ASSAY OF MAGNESIUM: CPT | Performed by: STUDENT IN AN ORGANIZED HEALTH CARE EDUCATION/TRAINING PROGRAM

## 2025-03-13 PROCEDURE — 96372 THER/PROPH/DIAG INJ SC/IM: CPT

## 2025-03-13 PROCEDURE — 2500000004 HC RX 250 GENERAL PHARMACY W/ HCPCS (ALT 636 FOR OP/ED)

## 2025-03-13 PROCEDURE — 84484 ASSAY OF TROPONIN QUANT: CPT | Performed by: STUDENT IN AN ORGANIZED HEALTH CARE EDUCATION/TRAINING PROGRAM

## 2025-03-13 PROCEDURE — 36415 COLL VENOUS BLD VENIPUNCTURE: CPT | Performed by: STUDENT IN AN ORGANIZED HEALTH CARE EDUCATION/TRAINING PROGRAM

## 2025-03-13 PROCEDURE — 83880 ASSAY OF NATRIURETIC PEPTIDE: CPT | Performed by: STUDENT IN AN ORGANIZED HEALTH CARE EDUCATION/TRAINING PROGRAM

## 2025-03-13 PROCEDURE — 99285 EMERGENCY DEPT VISIT HI MDM: CPT | Performed by: STUDENT IN AN ORGANIZED HEALTH CARE EDUCATION/TRAINING PROGRAM

## 2025-03-13 PROCEDURE — 1200000002 HC GENERAL ROOM WITH TELEMETRY DAILY

## 2025-03-13 PROCEDURE — 71045 X-RAY EXAM CHEST 1 VIEW: CPT

## 2025-03-13 PROCEDURE — 84075 ASSAY ALKALINE PHOSPHATASE: CPT | Performed by: STUDENT IN AN ORGANIZED HEALTH CARE EDUCATION/TRAINING PROGRAM

## 2025-03-13 PROCEDURE — 99223 1ST HOSP IP/OBS HIGH 75: CPT

## 2025-03-13 PROCEDURE — 85025 COMPLETE CBC W/AUTO DIFF WBC: CPT | Performed by: STUDENT IN AN ORGANIZED HEALTH CARE EDUCATION/TRAINING PROGRAM

## 2025-03-13 PROCEDURE — 2500000001 HC RX 250 WO HCPCS SELF ADMINISTERED DRUGS (ALT 637 FOR MEDICARE OP): Performed by: STUDENT IN AN ORGANIZED HEALTH CARE EDUCATION/TRAINING PROGRAM

## 2025-03-13 PROCEDURE — 93005 ELECTROCARDIOGRAM TRACING: CPT

## 2025-03-13 RX ORDER — ATORVASTATIN CALCIUM 80 MG/1
80 TABLET, FILM COATED ORAL DAILY
Status: DISCONTINUED | OUTPATIENT
Start: 2025-03-14 | End: 2025-03-14 | Stop reason: HOSPADM

## 2025-03-13 RX ORDER — GLUCAGON INJECTION, SOLUTION 1 MG/.2ML
1 INJECTION, SOLUTION SUBCUTANEOUS AS NEEDED
COMMUNITY

## 2025-03-13 RX ORDER — METFORMIN HYDROCHLORIDE 500 MG/1
500 TABLET, EXTENDED RELEASE ORAL DAILY
COMMUNITY

## 2025-03-13 RX ORDER — INSULIN GLARGINE-YFGN 100 [IU]/ML
60 INJECTION, SOLUTION SUBCUTANEOUS DAILY
COMMUNITY
Start: 2025-03-11

## 2025-03-13 RX ORDER — ONDANSETRON HYDROCHLORIDE 2 MG/ML
4 INJECTION, SOLUTION INTRAVENOUS EVERY 8 HOURS PRN
Status: DISCONTINUED | OUTPATIENT
Start: 2025-03-13 | End: 2025-03-14 | Stop reason: HOSPADM

## 2025-03-13 RX ORDER — NITROGLYCERIN 0.4 MG/1
0.4 TABLET SUBLINGUAL EVERY 5 MIN PRN
Status: DISCONTINUED | OUTPATIENT
Start: 2025-03-13 | End: 2025-03-14 | Stop reason: HOSPADM

## 2025-03-13 RX ORDER — LIDOCAINE AND MENTHOL 2; .5 G/50ML; G/50ML
1 LIQUID TOPICAL AS NEEDED
COMMUNITY

## 2025-03-13 RX ORDER — CLOPIDOGREL BISULFATE 75 MG/1
75 TABLET ORAL DAILY
Status: DISCONTINUED | OUTPATIENT
Start: 2025-03-14 | End: 2025-03-14 | Stop reason: HOSPADM

## 2025-03-13 RX ORDER — ONDANSETRON 4 MG/1
4 TABLET, FILM COATED ORAL EVERY 8 HOURS PRN
Status: DISCONTINUED | OUTPATIENT
Start: 2025-03-13 | End: 2025-03-14 | Stop reason: HOSPADM

## 2025-03-13 RX ORDER — ISOSORBIDE MONONITRATE 30 MG/1
30 TABLET, EXTENDED RELEASE ORAL DAILY
Status: DISCONTINUED | OUTPATIENT
Start: 2025-03-14 | End: 2025-03-14 | Stop reason: HOSPADM

## 2025-03-13 RX ORDER — DEXTROSE 50 % IN WATER (D50W) INTRAVENOUS SYRINGE
25
Status: DISCONTINUED | OUTPATIENT
Start: 2025-03-13 | End: 2025-03-14 | Stop reason: HOSPADM

## 2025-03-13 RX ORDER — RIVASTIGMINE TARTRATE 3 MG/1
3 CAPSULE ORAL DAILY
Status: DISCONTINUED | OUTPATIENT
Start: 2025-03-14 | End: 2025-03-14 | Stop reason: HOSPADM

## 2025-03-13 RX ORDER — ENOXAPARIN SODIUM 100 MG/ML
40 INJECTION SUBCUTANEOUS EVERY 24 HOURS
Status: DISCONTINUED | OUTPATIENT
Start: 2025-03-13 | End: 2025-03-14 | Stop reason: HOSPADM

## 2025-03-13 RX ORDER — LISINOPRIL 20 MG/1
20 TABLET ORAL DAILY
Status: DISCONTINUED | OUTPATIENT
Start: 2025-03-14 | End: 2025-03-14 | Stop reason: HOSPADM

## 2025-03-13 RX ORDER — ESCITALOPRAM OXALATE 10 MG/1
10 TABLET ORAL DAILY
Status: DISCONTINUED | OUTPATIENT
Start: 2025-03-14 | End: 2025-03-14 | Stop reason: HOSPADM

## 2025-03-13 RX ORDER — ACETAMINOPHEN 325 MG/1
650 TABLET ORAL EVERY 4 HOURS PRN
Status: DISCONTINUED | OUTPATIENT
Start: 2025-03-13 | End: 2025-03-14 | Stop reason: HOSPADM

## 2025-03-13 RX ORDER — DEXTROSE 50 % IN WATER (D50W) INTRAVENOUS SYRINGE
12.5
Status: DISCONTINUED | OUTPATIENT
Start: 2025-03-13 | End: 2025-03-14 | Stop reason: HOSPADM

## 2025-03-13 RX ORDER — ACETAMINOPHEN 650 MG/1
650 SUPPOSITORY RECTAL EVERY 4 HOURS PRN
Status: DISCONTINUED | OUTPATIENT
Start: 2025-03-13 | End: 2025-03-14 | Stop reason: HOSPADM

## 2025-03-13 RX ORDER — RIVASTIGMINE TARTRATE 3 MG/1
3 CAPSULE ORAL DAILY
COMMUNITY

## 2025-03-13 RX ORDER — INSULIN ASPART INJECTION 100 [IU]/ML
8 INJECTION, SOLUTION SUBCUTANEOUS
COMMUNITY

## 2025-03-13 RX ORDER — PANTOPRAZOLE SODIUM 20 MG/1
20 TABLET, DELAYED RELEASE ORAL
Status: DISCONTINUED | OUTPATIENT
Start: 2025-03-14 | End: 2025-03-14 | Stop reason: HOSPADM

## 2025-03-13 RX ORDER — INSULIN GLARGINE 100 [IU]/ML
60 INJECTION, SOLUTION SUBCUTANEOUS DAILY
Status: DISCONTINUED | OUTPATIENT
Start: 2025-03-14 | End: 2025-03-14 | Stop reason: HOSPADM

## 2025-03-13 RX ORDER — ASPIRIN 81 MG/1
81 TABLET ORAL DAILY
Status: DISCONTINUED | OUTPATIENT
Start: 2025-03-14 | End: 2025-03-14 | Stop reason: HOSPADM

## 2025-03-13 RX ORDER — ACETAMINOPHEN 160 MG/5ML
650 SOLUTION ORAL EVERY 4 HOURS PRN
Status: DISCONTINUED | OUTPATIENT
Start: 2025-03-13 | End: 2025-03-14 | Stop reason: HOSPADM

## 2025-03-13 RX ORDER — CARVEDILOL 12.5 MG/1
12.5 TABLET ORAL DAILY
Status: DISCONTINUED | OUTPATIENT
Start: 2025-03-14 | End: 2025-03-14 | Stop reason: HOSPADM

## 2025-03-13 RX ORDER — NITROGLYCERIN 0.4 MG/1
0.4 TABLET SUBLINGUAL ONCE
Status: COMPLETED | OUTPATIENT
Start: 2025-03-13 | End: 2025-03-13

## 2025-03-13 RX ADMIN — ENOXAPARIN SODIUM 40 MG: 40 INJECTION SUBCUTANEOUS at 16:57

## 2025-03-13 RX ADMIN — NITROGLYCERIN 0.4 MG: 0.4 TABLET SUBLINGUAL at 14:10

## 2025-03-13 SDOH — ECONOMIC STABILITY: INCOME INSECURITY: IN THE PAST 12 MONTHS HAS THE ELECTRIC, GAS, OIL, OR WATER COMPANY THREATENED TO SHUT OFF SERVICES IN YOUR HOME?: NO

## 2025-03-13 SDOH — SOCIAL STABILITY: SOCIAL INSECURITY: WITHIN THE LAST YEAR, HAVE YOU BEEN AFRAID OF YOUR PARTNER OR EX-PARTNER?: NO

## 2025-03-13 SDOH — SOCIAL STABILITY: SOCIAL INSECURITY: HAS ANYONE EVER THREATENED TO HURT YOUR FAMILY OR YOUR PETS?: NO

## 2025-03-13 SDOH — ECONOMIC STABILITY: FOOD INSECURITY: WITHIN THE PAST 12 MONTHS, THE FOOD YOU BOUGHT JUST DIDN'T LAST AND YOU DIDN'T HAVE MONEY TO GET MORE.: NEVER TRUE

## 2025-03-13 SDOH — SOCIAL STABILITY: SOCIAL INSECURITY: ABUSE: ADULT

## 2025-03-13 SDOH — SOCIAL STABILITY: SOCIAL INSECURITY
WITHIN THE LAST YEAR, HAVE YOU BEEN RAPED OR FORCED TO HAVE ANY KIND OF SEXUAL ACTIVITY BY YOUR PARTNER OR EX-PARTNER?: NO

## 2025-03-13 SDOH — SOCIAL STABILITY: SOCIAL INSECURITY
WITHIN THE LAST YEAR, HAVE YOU BEEN KICKED, HIT, SLAPPED, OR OTHERWISE PHYSICALLY HURT BY YOUR PARTNER OR EX-PARTNER?: NO

## 2025-03-13 SDOH — SOCIAL STABILITY: SOCIAL INSECURITY: WERE YOU ABLE TO COMPLETE ALL THE BEHAVIORAL HEALTH SCREENINGS?: YES

## 2025-03-13 SDOH — SOCIAL STABILITY: SOCIAL INSECURITY: HAVE YOU HAD THOUGHTS OF HARMING ANYONE ELSE?: NO

## 2025-03-13 SDOH — SOCIAL STABILITY: SOCIAL INSECURITY: HAVE YOU HAD ANY THOUGHTS OF HARMING ANYONE ELSE?: NO

## 2025-03-13 SDOH — ECONOMIC STABILITY: FOOD INSECURITY: WITHIN THE PAST 12 MONTHS, YOU WORRIED THAT YOUR FOOD WOULD RUN OUT BEFORE YOU GOT THE MONEY TO BUY MORE.: NEVER TRUE

## 2025-03-13 SDOH — SOCIAL STABILITY: SOCIAL INSECURITY: DO YOU FEEL UNSAFE GOING BACK TO THE PLACE WHERE YOU ARE LIVING?: NO

## 2025-03-13 SDOH — SOCIAL STABILITY: SOCIAL INSECURITY: ARE YOU OR HAVE YOU BEEN THREATENED OR ABUSED PHYSICALLY, EMOTIONALLY, OR SEXUALLY BY ANYONE?: NO

## 2025-03-13 SDOH — SOCIAL STABILITY: SOCIAL INSECURITY: WITHIN THE LAST YEAR, HAVE YOU BEEN HUMILIATED OR EMOTIONALLY ABUSED IN OTHER WAYS BY YOUR PARTNER OR EX-PARTNER?: NO

## 2025-03-13 SDOH — SOCIAL STABILITY: SOCIAL INSECURITY: ARE THERE ANY APPARENT SIGNS OF INJURIES/BEHAVIORS THAT COULD BE RELATED TO ABUSE/NEGLECT?: NO

## 2025-03-13 SDOH — SOCIAL STABILITY: SOCIAL INSECURITY: DO YOU FEEL ANYONE HAS EXPLOITED OR TAKEN ADVANTAGE OF YOU FINANCIALLY OR OF YOUR PERSONAL PROPERTY?: NO

## 2025-03-13 SDOH — SOCIAL STABILITY: SOCIAL INSECURITY: DOES ANYONE TRY TO KEEP YOU FROM HAVING/CONTACTING OTHER FRIENDS OR DOING THINGS OUTSIDE YOUR HOME?: NO

## 2025-03-13 ASSESSMENT — COGNITIVE AND FUNCTIONAL STATUS - GENERAL
PATIENT BASELINE BEDBOUND: NO
MOBILITY SCORE: 23
DAILY ACTIVITIY SCORE: 24
CLIMB 3 TO 5 STEPS WITH RAILING: A LITTLE

## 2025-03-13 ASSESSMENT — LIFESTYLE VARIABLES
AUDIT-C TOTAL SCORE: 0
PRESCIPTION_ABUSE_PAST_12_MONTHS: NO
HOW OFTEN DO YOU HAVE 6 OR MORE DRINKS ON ONE OCCASION: NEVER
HOW OFTEN DO YOU HAVE A DRINK CONTAINING ALCOHOL: NEVER
SUBSTANCE_ABUSE_PAST_12_MONTHS: NO
HAVE PEOPLE ANNOYED YOU BY CRITICIZING YOUR DRINKING: NO
EVER HAD A DRINK FIRST THING IN THE MORNING TO STEADY YOUR NERVES TO GET RID OF A HANGOVER: NO
AUDIT-C TOTAL SCORE: 0
HOW MANY STANDARD DRINKS CONTAINING ALCOHOL DO YOU HAVE ON A TYPICAL DAY: PATIENT DOES NOT DRINK
TOTAL SCORE: 0
HAVE YOU EVER FELT YOU SHOULD CUT DOWN ON YOUR DRINKING: NO
SKIP TO QUESTIONS 9-10: 1
EVER FELT BAD OR GUILTY ABOUT YOUR DRINKING: NO

## 2025-03-13 ASSESSMENT — PAIN SCALES - GENERAL
PAINLEVEL_OUTOF10: 4
PAINLEVEL_OUTOF10: 0 - NO PAIN
PAINLEVEL_OUTOF10: 2

## 2025-03-13 ASSESSMENT — ACTIVITIES OF DAILY LIVING (ADL)
ADEQUATE_TO_COMPLETE_ADL: YES
LACK_OF_TRANSPORTATION: NO
PATIENT'S MEMORY ADEQUATE TO SAFELY COMPLETE DAILY ACTIVITIES?: YES
WALKS IN HOME: INDEPENDENT
HEARING - LEFT EAR: FUNCTIONAL
DRESSING YOURSELF: INDEPENDENT
HEARING - RIGHT EAR: FUNCTIONAL
JUDGMENT_ADEQUATE_SAFELY_COMPLETE_DAILY_ACTIVITIES: YES
BATHING: INDEPENDENT
GROOMING: INDEPENDENT
FEEDING YOURSELF: INDEPENDENT
TOILETING: INDEPENDENT

## 2025-03-13 ASSESSMENT — HEART SCORE
TROPONIN: LESS THAN OR EQUAL TO NORMAL LIMIT
HEART SCORE: 6
HISTORY: HIGHLY SUSPICIOUS
ECG: NON-SPECIFIC REPOLARIZATION DISTURBANCE
AGE: 45-64
RISK FACTORS: >2 RISK FACTORS OR HX OF ATHEROSCLEROTIC DISEASE

## 2025-03-13 ASSESSMENT — PAIN DESCRIPTION - DESCRIPTORS: DESCRIPTORS: POUNDING

## 2025-03-13 ASSESSMENT — COLUMBIA-SUICIDE SEVERITY RATING SCALE - C-SSRS
2. HAVE YOU ACTUALLY HAD ANY THOUGHTS OF KILLING YOURSELF?: NO
6. HAVE YOU EVER DONE ANYTHING, STARTED TO DO ANYTHING, OR PREPARED TO DO ANYTHING TO END YOUR LIFE?: NO
6. HAVE YOU EVER DONE ANYTHING, STARTED TO DO ANYTHING, OR PREPARED TO DO ANYTHING TO END YOUR LIFE?: NO
1. IN THE PAST MONTH, HAVE YOU WISHED YOU WERE DEAD OR WISHED YOU COULD GO TO SLEEP AND NOT WAKE UP?: NO
2. HAVE YOU ACTUALLY HAD ANY THOUGHTS OF KILLING YOURSELF?: NO
1. IN THE PAST MONTH, HAVE YOU WISHED YOU WERE DEAD OR WISHED YOU COULD GO TO SLEEP AND NOT WAKE UP?: NO

## 2025-03-13 ASSESSMENT — PATIENT HEALTH QUESTIONNAIRE - PHQ9
SUM OF ALL RESPONSES TO PHQ9 QUESTIONS 1 & 2: 0
1. LITTLE INTEREST OR PLEASURE IN DOING THINGS: NOT AT ALL
2. FEELING DOWN, DEPRESSED OR HOPELESS: NOT AT ALL

## 2025-03-13 ASSESSMENT — PAIN - FUNCTIONAL ASSESSMENT: PAIN_FUNCTIONAL_ASSESSMENT: 0-10

## 2025-03-13 NOTE — H&P
History Of Present Illness  Victorino Carr is a 59 y.o. male with history of CAD, HTN, presence of cardiac pacemaker presented to ED complaining of left-sided chest pain with associated diaphoresis and shortness of breath, onset 3 hours prior to arriving to ED.  Pain radiated to left shoulder, which improved with 2 nitroglycerin at home, also took his aspirin.  States he will experience chest pain at times that is usually responsive to nitroglycerin.  He states he was supposed have an appointment tomorrow afternoon with cardiology to evaluate for potential new pacemaker battery.  He denies fevers, cough, shortness of breath, congestion, palpitations.  Currently denying any symptoms, states he would be at work if he could.    ED course: Vitals stable.  Per radiology, there is no acute process on CXR.  Labs including cardiac biomarkers unremarkable.  ECG without signs of ischemia.  Patient treated with nitroglycerin and cardiology consulted.     Past Medical History  Past Medical History:   Diagnosis Date    Diabetes mellitus (Multi)     Hypertension        Surgical History  Past Surgical History:   Procedure Laterality Date    OTHER SURGICAL HISTORY  08/26/2019    Pacemaker insertion    OTHER SURGICAL HISTORY  08/26/2019    Coronary artery stent placement        Social History  He reports that he has quit smoking. His smoking use included cigarettes. He has never used smokeless tobacco. He reports that he does not drink alcohol and does not use drugs.    Family History  Family History   Problem Relation Name Age of Onset    Dementia Mother          Allergies  Patient has no known allergies.    Review of Systems  10 point ROS negative except as specified in HPI    Physical Exam  HENT:      Head: Atraumatic.      Nose: Nose normal.   Eyes:      Conjunctiva/sclera: Conjunctivae normal.   Cardiovascular:      Rate and Rhythm: Normal rate and regular rhythm.      Pulses: Normal pulses.      Heart sounds: Normal heart  sounds.   Pulmonary:      Effort: Pulmonary effort is normal.      Breath sounds: Normal breath sounds.   Abdominal:      Palpations: Abdomen is soft.   Musculoskeletal:         General: No swelling.   Skin:     General: Skin is warm and dry.   Neurological:      Mental Status: He is alert. Mental status is at baseline.   Psychiatric:         Behavior: Behavior normal.          Last Recorded Vitals  Blood pressure 161/81, pulse 70, temperature 36.9 °C (98.4 °F), resp. rate 20, weight 122 kg (270 lb), SpO2 94%.    Relevant Results    Results for orders placed or performed during the hospital encounter of 03/13/25 (from the past 24 hours)   CBC and Auto Differential   Result Value Ref Range    WBC 11.5 (H) 4.4 - 11.3 x10*3/uL    nRBC 0.0 0.0 - 0.0 /100 WBCs    RBC 5.25 4.50 - 5.90 x10*6/uL    Hemoglobin 14.8 13.5 - 17.5 g/dL    Hematocrit 46.8 41.0 - 52.0 %    MCV 89 80 - 100 fL    MCH 28.2 26.0 - 34.0 pg    MCHC 31.6 (L) 32.0 - 36.0 g/dL    RDW 13.2 11.5 - 14.5 %    Platelets 278 150 - 450 x10*3/uL    Neutrophils % 67.2 40.0 - 80.0 %    Immature Granulocytes %, Automated 0.4 0.0 - 0.9 %    Lymphocytes % 23.0 13.0 - 44.0 %    Monocytes % 6.9 2.0 - 10.0 %    Eosinophils % 2.1 0.0 - 6.0 %    Basophils % 0.4 0.0 - 2.0 %    Neutrophils Absolute 7.73 (H) 1.20 - 7.70 x10*3/uL    Immature Granulocytes Absolute, Automated 0.05 0.00 - 0.70 x10*3/uL    Lymphocytes Absolute 2.64 1.20 - 4.80 x10*3/uL    Monocytes Absolute 0.79 0.10 - 1.00 x10*3/uL    Eosinophils Absolute 0.24 0.00 - 0.70 x10*3/uL    Basophils Absolute 0.05 0.00 - 0.10 x10*3/uL   Comprehensive Metabolic Panel   Result Value Ref Range    Glucose 174 (H) 74 - 99 mg/dL    Sodium 138 136 - 145 mmol/L    Potassium 4.3 3.5 - 5.3 mmol/L    Chloride 105 98 - 107 mmol/L    Bicarbonate 23 21 - 32 mmol/L    Anion Gap 14 10 - 20 mmol/L    Urea Nitrogen 20 6 - 23 mg/dL    Creatinine 1.31 (H) 0.50 - 1.30 mg/dL    eGFR 62 >60 mL/min/1.73m*2    Calcium 9.1 8.6 - 10.3 mg/dL     Albumin 3.9 3.4 - 5.0 g/dL    Alkaline Phosphatase 88 33 - 136 U/L    Total Protein 6.9 6.4 - 8.2 g/dL    AST 14 9 - 39 U/L    Bilirubin, Total 1.1 0.0 - 1.2 mg/dL    ALT 16 10 - 52 U/L   Magnesium   Result Value Ref Range    Magnesium 1.92 1.60 - 2.40 mg/dL   Troponin I, High Sensitivity, Initial   Result Value Ref Range    Troponin I, High Sensitivity 13 0 - 20 ng/L   B-type natriuretic peptide   Result Value Ref Range    BNP 70 0 - 99 pg/mL   Troponin, High Sensitivity, 1 Hour   Result Value Ref Range    Troponin I, High Sensitivity 13 0 - 20 ng/L     XR chest 1 view    Result Date: 3/13/2025  STUDY: Chest Radiograph;  03/13/2025, 1:45 PM. INDICATION: Generalized chest pain. COMPARISON: CXR: 01/04/24, 07/18/23, 01/26/23. ACCESSION NUMBER(S): [id number] ORDERING CLINICIAN: TAVO TERRY TECHNIQUE:  Frontal chest was obtained at 13:45 hours. FINDINGS: CARDIOMEDIASTINAL SILHOUETTE: Cardiomediastinal silhouette is normal in size and configuration. There is a pacemaker on the right.  Sternal wires are present.  LUNGS: Lungs are clear.  ABDOMEN: No remarkable upper abdominal findings.  BONES: No acute osseous changes.    No acute disease or change. Signed by Jim Catsle MD        Assessment/Plan   Assessment & Plan  Chest pain, unspecified type    59-year-old male with history of CAD and presence of cardiac pacemaker presented to ED with angina responsive to nitroglycerin.  Workup negative for acute process/cardiac emergency.  Patient treated with 1 dose of nitroglycerin and admitted to observation with telemetry and cardiology consult under Dr. Schmid for further evaluation and care.    #Chest pain  - Continue nitro as needed with BP parameters  - ECG per ACS symptoms  - Cardio consulted in ED, appreciate recs  - Vitals every 4 hours, telemetry  - Cardiac/carb consistent diet  - Continue anticoag, beta-blocker    Chronic conditions  #CAD  #Hyperlipidemia  #Hypertension  #T2DM  #Angina  #Depression  - Continue  home meds        I spent 45 minutes in the professional and overall care of this patient.      Jesse Bolton PA-C

## 2025-03-13 NOTE — PROGRESS NOTES
Pharmacy Medication History Review    Victorino Carr is a 59 y.o. male admitted for Chest pain, unspecified type. Pharmacy reviewed the patient's [id number] medications and allergies for accuracy.    The list below reflectives the updated PTA list. Please review each medication in order reconciliation for additional clarification and justification.  Prior to Admission medications    Medication Sig Start Date End Date Authorizing Provider   aspirin 81 mg EC tablet Take 1 tablet (81 mg) by mouth once daily.   Historical Provider, MD   atorvastatin (Lipitor) 80 mg tablet Take 1 tablet (80 mg) by mouth once daily.   Vasquez Sosa MD   carvedilol (Coreg) 12.5 mg tablet Take 1 tablet (12.5 mg) by mouth once daily.  Prescribed BID, only taking 1QD   Vasquez Sosa MD   clopidogrel (Plavix) 75 mg tablet Take 1 tablet (75 mg) by mouth once daily.   Historical Provider, MD   empagliflozin (Jardiance) 25 mg Take 1 tablet (25 mg) by mouth once daily.   Vasquez Sosa MD   escitalopram (Lexapro) 10 mg tablet Take 1 tablet (10 mg) by mouth once daily.   Vasquez Sosa MD   glucagon (Gvoke HypoPen 2-Pack) 1 mg/0.2 mL auto-injector Inject 1 mg under the skin if needed.   Historical Provider, MD   insulin aspart, with niacinamide, (Fiasp FlexTouch U-100 Insulin) 100 unit/mL (3 mL) pen Inject 8 Units under the skin 3 times a day before meals.   Historical Provider, MD   insulin glargine-yfgn 100 unit/mL (3 mL) pen Inject 60 Units under the skin once daily. 3/11/25  Historical Provider, MD   isosorbide mononitrate ER (Imdur) 30 mg 24 hr tablet Take 1 tablet (30 mg) by mouth once daily. Do not crush or chew.   Historical Provider, MD   lisinopril 20 mg tablet Take 1 tablet (20 mg) by mouth once daily.   Vasquez Sosa MD   metFORMIN  mg 24 hr tablet Take 1 tablet (500 mg) by mouth once daily.  Prescribed 2BID, only taking 1QD   Historical Provider, MD   nitroglycerin (Nitrostat) 0.4 mg SL tablet Place 1 tablet (0.4  mg) under the tongue every 5 minutes if needed for chest pain.   Historical Provider, MD   pantoprazole (ProtoNix) 20 mg EC tablet Take 1 tablet (20 mg) by mouth once daily.   Historical Provider, MD   rivastigmine (Exelon) 3 mg capsule Take 1 capsule (3 mg) by mouth once daily.  Prescribed BID, only taking QD   Historical Provider, MD   lidocaine-menthol 4-1 % liquid roll-on Apply 1 Application topically if needed.   Historical Provider, MD   semaglutide 0.25 mg or 0.5 mg (2 mg/3 mL) pen injector Inject 0.25 mg under the skin 1 (one) time per week. On Monday   Vasquez Sosa MD        The list below reflectives the updated allergy list. Please review each documented allergy for additional clarification and justification.  Allergies  Reviewed by Trang Marx RN on 3/13/2025   No Known Allergies         Below are additional concerns with the patient's PTA list.      Olivia Nguyen

## 2025-03-13 NOTE — ED PROVIDER NOTES
HPI   Chief Complaint   Patient presents with    Chest Pain     Pt arrives [facility] from home. States chest pain started 3 hours ago @ home. Took 2 nitrotabs of his own @ home with relief. Hx bypass surgery. States all his care is at [facility] and has appointment tomorrow for pacemaker diagnosis. 3/10 pain on arrival       59-year-old male patient came into the emergency department complaining of chest pain with onset 3 hours prior to arrival.  The episode was associated with diaphoresis.  Chest pain was located to the left side of the chest radiating to the left shoulder.  Patient took his aspirin and 2 doses of nitroglycerin at home with improvement in his pain, but he still has minimal pain at this moment that he describes as his chronic pain.  Patient was supposed to have an appointment with the cardiologist tomorrow to evaluate him for potential pacemaker battery change.  Patient denies any fever, cough, congestion, shortness of breath, palpitation, abdominal pain, recent traveling or any other complaints.               Patient History   Past Medical History:   Diagnosis Date    Diabetes mellitus (Multi)     Hypertension      Past Surgical History:   Procedure Laterality Date    OTHER SURGICAL HISTORY  08/26/2019    Pacemaker insertion    OTHER SURGICAL HISTORY  08/26/2019    Coronary artery stent placement     Family History   Problem Relation Name Age of Onset    Dementia Mother       Social History     Tobacco Use    Smoking status: Former     Types: Cigarettes    Smokeless tobacco: Never   Substance Use Topics    Alcohol use: Never    Drug use: Never       Physical Exam   ED Triage Vitals [03/13/25 1324]   Temperature Heart Rate Respirations BP   36.9 °C (98.4 °F) 66 20 142/82      SpO2 Temp src Heart Rate Source Patient Position   -- -- -- --      BP Location FiO2 (%)     -- --       Physical Exam  Constitutional:       General: He is not in acute distress.     Appearance: He is well-developed. He is obese. He  is not toxic-appearing.   Eyes:      Extraocular Movements: Extraocular movements intact.   Cardiovascular:      Rate and Rhythm: Normal rate and regular rhythm.      Heart sounds: Normal heart sounds.   Pulmonary:      Effort: Pulmonary effort is normal.      Breath sounds: Normal breath sounds.   Abdominal:      Palpations: Abdomen is soft.   Musculoskeletal:         General: Normal range of motion.      Cervical back: Normal range of motion.   Skin:     General: Skin is warm.      Capillary Refill: Capillary refill takes less than 2 seconds.   Neurological:      General: No focal deficit present.      Mental Status: He is alert and oriented to person, place, and time.   Psychiatric:         Mood and Affect: Mood normal.         Behavior: Behavior normal.           ED Course & MDM   ED Course as of 03/13/25 2051   u Mar 13, 2025   1333 EKG was completed reviewed.  Ventricular rate 60.  Sinus rhythm.  Incomplete right bundle branch block noted.  MT intervals 139.  No QRS widening.  No STEMI [AM]   1347 Patient stated he is currently feeling much better after he took his nitro at [AM]   1512 Patient was reevaluated.  Patient currently denies any chest pain or any symptoms.  Family members at bedside now.  Results were discussed with him including the negative troponin and the EKG findings.  Patient has a heart score of 6.  Patient has already received aspirin at home.  Recommendation for admission for further cardiac evaluation was made and patient agree with the plan of care.  Will call out the hospital [AM]   1520 Chest x-ray was personally reviewed without any acute findings.  Chest x-ray report was also seen. [AM]   1536 Dr Schmid called back. He recommends to put the consult to cardiologist and to admit to his service.  [AM]      ED Course User Index  [AM] Blanquita Tom MD         Diagnoses as of 03/13/25 2051   Chest pain, unspecified type                 No data recorded     [place] Coma Scale Score:  15 (03/13/25 1402 : Trang Marx RN) HEART Score: 6 (03/13/25 1512 : Blanquita Tom MD)                         Medical Decision Making      Procedure  Procedures     Blanquita Tom MD  03/13/25 2040       Blanquita Tom MD  03/13/25 2051

## 2025-03-14 VITALS
OXYGEN SATURATION: 92 % | DIASTOLIC BLOOD PRESSURE: 67 MMHG | BODY MASS INDEX: 34.67 KG/M2 | RESPIRATION RATE: 18 BRPM | SYSTOLIC BLOOD PRESSURE: 129 MMHG | TEMPERATURE: 97 F | WEIGHT: 270 LBS | HEART RATE: 64 BPM

## 2025-03-14 LAB
ATRIAL RATE: 60 BPM
CHOLEST SERPL-MCNC: 110 MG/DL (ref 0–199)
CHOLESTEROL/HDL RATIO: 3.3
GLUCOSE BLD MANUAL STRIP-MCNC: 111 MG/DL (ref 74–99)
GLUCOSE BLD MANUAL STRIP-MCNC: 116 MG/DL (ref 74–99)
GLUCOSE BLD MANUAL STRIP-MCNC: 97 MG/DL (ref 74–99)
HDLC SERPL-MCNC: 33.1 MG/DL
HOLD SPECIMEN: NORMAL
LDLC SERPL CALC-MCNC: 48 MG/DL
NON HDL CHOLESTEROL: 77 MG/DL (ref 0–149)
P AXIS: 17 DEGREES
PR INTERVAL: 139 MS
Q ONSET: 253 MS
QRS COUNT: 10 BEATS
QRS DURATION: 113 MS
QT INTERVAL: 408 MS
QTC CALCULATION(BAZETT): 408 MS
QTC FREDERICIA: 408 MS
R AXIS: -88 DEGREES
T AXIS: 93 DEGREES
T OFFSET: 457 MS
TRIGL SERPL-MCNC: 145 MG/DL (ref 0–149)
VENTRICULAR RATE: 60 BPM
VLDL: 29 MG/DL (ref 0–40)

## 2025-03-14 PROCEDURE — 97165 OT EVAL LOW COMPLEX 30 MIN: CPT | Mod: GO

## 2025-03-14 PROCEDURE — 80061 LIPID PANEL: CPT

## 2025-03-14 PROCEDURE — 36415 COLL VENOUS BLD VENIPUNCTURE: CPT

## 2025-03-14 PROCEDURE — 2500000001 HC RX 250 WO HCPCS SELF ADMINISTERED DRUGS (ALT 637 FOR MEDICARE OP)

## 2025-03-14 PROCEDURE — 97161 PT EVAL LOW COMPLEX 20 MIN: CPT | Mod: GP

## 2025-03-14 PROCEDURE — 82947 ASSAY GLUCOSE BLOOD QUANT: CPT

## 2025-03-14 PROCEDURE — 2500000002 HC RX 250 W HCPCS SELF ADMINISTERED DRUGS (ALT 637 FOR MEDICARE OP, ALT 636 FOR OP/ED)

## 2025-03-14 PROCEDURE — G0378 HOSPITAL OBSERVATION PER HR: HCPCS

## 2025-03-14 RX ORDER — ACETAMINOPHEN 325 MG/1
650 TABLET ORAL EVERY 4 HOURS PRN
Qty: 30 TABLET | Refills: 0 | Status: SHIPPED | OUTPATIENT
Start: 2025-03-14

## 2025-03-14 RX ORDER — METFORMIN HYDROCHLORIDE 500 MG/1
500 TABLET, EXTENDED RELEASE ORAL DAILY
Status: DISCONTINUED | OUTPATIENT
Start: 2025-03-14 | End: 2025-03-14 | Stop reason: HOSPADM

## 2025-03-14 RX ORDER — LIDOCAINE 50 MG/G
1 PATCH TOPICAL DAILY
Qty: 30 PATCH | Refills: 0 | Status: SHIPPED | OUTPATIENT
Start: 2025-03-14 | End: 2025-04-13

## 2025-03-14 RX ADMIN — CLOPIDOGREL BISULFATE 75 MG: 75 TABLET ORAL at 09:44

## 2025-03-14 RX ADMIN — PSYLLIUM HUSK 1 PACKET: 3.4 POWDER ORAL at 09:43

## 2025-03-14 RX ADMIN — ISOSORBIDE MONONITRATE 30 MG: 30 TABLET, EXTENDED RELEASE ORAL at 09:44

## 2025-03-14 RX ADMIN — CARVEDILOL 12.5 MG: 12.5 TABLET, FILM COATED ORAL at 09:44

## 2025-03-14 RX ADMIN — ESCITALOPRAM OXALATE 10 MG: 10 TABLET ORAL at 09:44

## 2025-03-14 RX ADMIN — ASPIRIN 81 MG: 81 TABLET, COATED ORAL at 09:45

## 2025-03-14 RX ADMIN — ATORVASTATIN CALCIUM 80 MG: 80 TABLET, FILM COATED ORAL at 09:44

## 2025-03-14 RX ADMIN — LISINOPRIL 20 MG: 20 TABLET ORAL at 09:45

## 2025-03-14 RX ADMIN — EMPAGLIFLOZIN 25 MG: 10 TABLET, FILM COATED ORAL at 09:44

## 2025-03-14 RX ADMIN — INSULIN GLARGINE 60 UNITS: 100 INJECTION, SOLUTION SUBCUTANEOUS at 09:43

## 2025-03-14 RX ADMIN — PANTOPRAZOLE SODIUM 20 MG: 20 TABLET, DELAYED RELEASE ORAL at 06:23

## 2025-03-14 RX ADMIN — RIVASTIGMINE TARTRATE 3 MG: 3 CAPSULE ORAL at 09:43

## 2025-03-14 ASSESSMENT — ENCOUNTER SYMPTOMS
CHEST TIGHTNESS: 1
PALPITATIONS: 1
FATIGUE: 1
SHORTNESS OF BREATH: 1

## 2025-03-14 ASSESSMENT — COGNITIVE AND FUNCTIONAL STATUS - GENERAL
TOILETING: A LITTLE
HELP NEEDED FOR BATHING: A LITTLE
DAILY ACTIVITIY SCORE: 20
MOBILITY SCORE: 23
DRESSING REGULAR LOWER BODY CLOTHING: A LOT
CLIMB 3 TO 5 STEPS WITH RAILING: A LITTLE

## 2025-03-14 ASSESSMENT — PAIN SCALES - GENERAL
PAINLEVEL_OUTOF10: 0 - NO PAIN
PAINLEVEL_OUTOF10: 0 - NO PAIN

## 2025-03-14 ASSESSMENT — PAIN - FUNCTIONAL ASSESSMENT
PAIN_FUNCTIONAL_ASSESSMENT: 0-10
PAIN_FUNCTIONAL_ASSESSMENT: 0-10

## 2025-03-14 NOTE — CONSULTS
Cardiology Consult    Impression:  Atypical chest pain without ACS  CAD status post CABG.  In December 2020 for cardiac catheterization at [facility] performed showing patent grafts, underwent PCI of a small OM using NINA and DCB.  Hypertension  Dyslipidemia  Diabetes insulin-dependent uncontrolled last A1c available is 9  Morbid obesity obstructive sleep apnea obesity hypoventilation syndrome  Plan:  No evidence of ACS.  Troponin undetectable and EKG without ischemic changes.  In light of recent cardiac catheterization and complete revascularization in the absence of ACS, patient can be discharged from cardiac standpoint with outpatient cardiology follow-up.  Continue outpatient cardiac medications.    HPI:  59-year-old above detailed past medical history, underwent cardiac catheterization at [facility] in December 2024 which showed patent LIMA to LAD, patent SVG to OM diagonal etc., underwent PCI of small OM branch with NINA and DCB.  This was done for non-STEMI and slightly elevated troponin.  Presented to the ER with atypical chest pain pleuritic in nature.  Troponins are undetectable and EKG without ischemic changes.  Currently patient is asymptomatic resting comfortably and sleeping without symptoms upon awakening.  Meds:  Scheduled medications  aspirin, 81 mg, oral, Daily  atorvastatin, 80 mg, oral, Daily  carvedilol, 12.5 mg, oral, Daily  clopidogrel, 75 mg, oral, Daily  empagliflozin, 25 mg, oral, Daily  enoxaparin, 40 mg, subcutaneous, q24h  escitalopram, 10 mg, oral, Daily  insulin glargine, 60 Units, subcutaneous, Daily  isosorbide mononitrate ER, 30 mg, oral, Daily  lisinopril, 20 mg, oral, Daily  pantoprazole, 20 mg, oral, Daily before breakfast  pneumoc 20-deidre conj-dip cr(PF), 0.5 mL, intramuscular, During hospitalization  psyllium, 1 packet, oral, Daily  rivastigmine, 3 mg, oral, Daily      Continuous medications     PRN medications  PRN medications: acetaminophen **OR** acetaminophen **OR** acetaminophen, dextrose,  dextrose, glucagon, glucagon, nitroglycerin, ondansetron **OR** ondansetron, oxygen    PMHx:  Detailed above  Social history:  Negative    Review of systems:  HPI  Physical exam:  Vitals:    03/14/25 1137   BP: 115/58   Pulse: 60   Resp:    Temp: 36.6 °C (97.9 °F)   SpO2: 93%      Regular rhythm and rate normal S1-S2, faint systolic murmur.  Good bilateral air entry abdomen soft distended.  Peripheral pulse positive 1+ pitting edema.  EKG:  Normal sinus rhythm  Echo:  No results found for this or any previous visit.   Labs:  Lab Results   Component Value Date    WBC 11.5 (H) 03/13/2025    HGB 14.8 03/13/2025    HCT 46.8 03/13/2025     03/13/2025    CHOL 110 03/14/2025    TRIG 145 03/14/2025    HDL 33.1 03/14/2025    ALT 16 03/13/2025    AST 14 03/13/2025     03/13/2025    K 4.3 03/13/2025     03/13/2025    CREATININE 1.31 (H) 03/13/2025    BUN 20 03/13/2025    CO2 23 03/13/2025    HGBA1C 9.0 (A) 01/03/2025     par

## 2025-03-14 NOTE — PROGRESS NOTES
Occupational Therapy    Evaluation    Patient Name: Victorino Carr  MRN: [id number]  Today's Date: 3/14/2025  Time Calculation  Start Time: 1015  Stop Time: 1026  Time Calculation (min): 11 min    Assessment  IP OT Assessment  OT Assessment: Patient presents impaired dynamic stand balance and impaired flexibility affecting ADLs; patient would benefit from O.T. services while in hospital to improve independence with ADLs, transfers & mobility.  Prognosis: Good  Barriers to Discharge Home: No anticipated barriers  End of Session Patient Position:  (sitting edge of bed, call light in reach.)    Plan:  Treatment Interventions: ADL retraining, Compensatory technique education  OT Frequency: 2 times per week  OT Discharge Recommendations: No OT needed after discharge  OT - OK to Discharge: Yes (from an O.T. standpoint)    Subjective     Current Problem:  Patient Active Problem List   Diagnosis    Primary hypertension    Anxiety    Coronary arteriosclerosis    Colon polyps    Dyspepsia    Hyperlipidemia LDL goal <70    Osteoarthritis    Presence of permanent cardiac pacemaker    Type 2 diabetes mellitus with diabetic peripheral angiopathy without gangrene, with long-term current use of insulin (Multi)    ROGELIO (obstructive sleep apnea)    SSS (sick sinus syndrome) (Multi)    Chest pain, unspecified type    Chest pain       General:  General  Reason for Referral: OT eval & treat  Referred By: Che  Past Medical History Relevant to Rehab: 3/13/25:Pt presents with c/o CP, SOB, n/t radiating down LUE. Troponins (-). Cardiology consulted.    PMH:  DM, CAD, HTN, pacemaker  Co-Treatment: PT  Co-Treatment Reason: To maximize patient safety & mobility  Prior to Session Communication: Bedside nurse  Patient Position Received: Bed, 2 rail up, Alarm off, not on at start of session  General Comment: Patient cleared for therapy by nursing.    Precautions:  Precautions Comment: Activity: no restrictions      Pain:  Pain Assessment  Pain  Assessment: 0-10  0-10 (Numeric) Pain Score: 0 - No pain    Objective     Cognition:  Overall Cognitive Status: Within Functional Limits        Home Living:  Home Living Comments: Lives alone in house with bed/bath 1st floor; 8 steps to enter with rail.  Independent ADLs, transfers & mobility without device. Independent IADLs, drives, works. Owns reachers, sock aide, standard height toilet, tub/shower with seat. Reports dizziness over past 1-2 weeks, needed to hold onto walls at times, denies falls.        ADL:  Grooming Assistance: Independent  UE Dressing Assistance: Independent  LE Dressing Assistance: Moderate  LE Dressing Deficit:  (unable to reach LE's for ADLs due to limited flexibility; would benefit from adaptive equipment as prior to admit)    Activity Tolerance:  Endurance: Endurance does not limit participation in activity    Bed Mobility/Transfers:      Transfers  Transfer:  (supervision sit to stand)    Ambulation/Gait Training:  Functional Mobility  Functional Mobility Performed:  (SBA without device)    Sensation:  Light Touch: No apparent deficits    Strength:  Strength Comments: BUE grossly WFL for ADLs    Coordination:  Movements are Fluid and Coordinated: Yes       Outcome Measures: [facility] Daily Activity  Putting on and taking off regular lower body clothing: A lot  Bathing (including washing, rinsing, drying): A little  Putting on and taking off regular upper body clothing: None  Toileting, which includes using toilet, bedpan or urinal: A little  Taking care of personal grooming such as brushing teeth: None  Eating Meals: None  Daily Activity - Total Score: 20                    EDUCATION:     Education Documentation  ADL Training, taught by Dejah Toribio OT at 3/14/2025  1:36 PM.  Learner: Patient  Readiness: Acceptance  Method: Explanation  Response: Verbalizes Understanding    Education Comments  No comments found.        Goals:   Encounter Problems       Encounter Problems (Active)       OT  Goals       Increase LE dressing to modified independent with adaptive equipment as needed.  (Progressing)       Start:  03/14/25    Expected End:  03/28/25            Increase functional transfers & mobility to/from bed, chair & commode to modified independent with DME for safety  (Progressing)       Start:  03/14/25    Expected End:  03/28/25            Demonstrate compliance to energy conservation techs and safety techs during ADLs   (Progressing)       Start:  03/14/25    Expected End:  03/28/25

## 2025-03-14 NOTE — PROGRESS NOTES
Physical Therapy    Physical Therapy Evaluation    Patient Name: Victorino Carr  MRN: [id number]  Today's Date: 3/14/2025   Time Calculation  Start Time: 1014  Stop Time: 1026  Time Calculation (min): 12 min  908/[id number]-B    Assessment/Plan   PT Assessment  Rehab Prognosis: Good  Evaluation/Treatment Tolerance: Patient tolerated treatment well  Medical Staff Made Aware: Yes  Assessment Comment: Pt appears to present near functional baseline. No further acute PT needs. Will d/c from caseload.  End of Session Patient Position: Bed, 2 rail up, Alarm off, not on at start of session  IP OR SWING BED PT PLAN  Inpatient or Swing Bed: Inpatient  PT Plan  PT Plan: PT Eval only  PT Eval Only Reason: At baseline function  PT Frequency: PT eval only  PT Discharge Recommendations: No PT needed after discharge  PT Recommended Transfer Status: Independent  PT - OK to Discharge: Yes    Subjective     Current Problem:  1. Chest pain, unspecified type          Patient Active Problem List   Diagnosis    Primary hypertension    Anxiety    Coronary arteriosclerosis    Colon polyps    Dyspepsia    Hyperlipidemia LDL goal <70    Osteoarthritis    Presence of permanent cardiac pacemaker    Type 2 diabetes mellitus with diabetic peripheral angiopathy without gangrene, with long-term current use of insulin (Multi)    ROGELIO (obstructive sleep apnea)    SSS (sick sinus syndrome) (Multi)    Chest pain, unspecified type    Chest pain       General Visit Information:  General  Reason for Referral: PT eval and treat  Referred By: Che  Past Medical History Relevant to Rehab: DM, CAD, HTN, pacemaker  Co-Treatment: OT  Co-Treatment Reason: possible two person assist, maximize functional mobility  Prior to Session Communication: Bedside nurse  Patient Position Received: Bed, 2 rail up, Alarm off, not on at start of session  General Comment: Pt presents with c/o CP, SOB, n/t radiating down LUE. Troponins (-). Cardiology consulted. Pt cleared for therapy  by nursing. Pt EOB, agreeable.    Home Living:  Home Living  Home Living Comments: Pt lives alone in a house /c 8STE /c rail. Dtr is local, supportive.    Prior Level of Function:  Prior Function Per Pt/Caregiver Report  Prior Function Comments: Indep at baseline. Works (), drives. Denies AD use or falls, but furniture walks in the home. Reports dizziness on/off ~1 week.    Precautions:  Precautions  Precautions Comment: cardiology consult       Objective     Pain:  Pain Assessment  Pain Assessment: 0-10  0-10 (Numeric) Pain Score: 0 - No pain    Cognition:  Cognition  Overall Cognitive Status: Within Functional Limits    General Assessments:      Activity Tolerance  Endurance: Endurance does not limit participation in activity  Sensation  Sensation Comment: denies n/t  Strength  Strength Comments: BLE at least 3/5 throughout     Coordination  Movements are Fluid and Coordinated: Yes     Dynamic Sitting Balance  Dynamic Sitting-Comments: G  Dynamic Standing Balance  Dynamic Standing-Comments: G    Functional Assessments:        Transfers  Transfer: Yes  Transfer 1  Trials/Comments 1: Sit-stand /c supervision, UE support.  Ambulation/Gait Training  Ambulation/Gait Training Performed: Yes (Pt ambulates 40' /c SBA-supervision. Step through gait, miguel feet slightly everted. Rounded posture. Pt denies any dizziness/SOB currently.)          Outcome Measures:     [facility] Basic Mobility  Turning from your back to your side while in a flat bed without using bedrails: None  Moving from lying on your back to sitting on the side of a flat bed without using bedrails: None  Moving to and from bed to chair (including a wheelchair): None  Standing up from a chair using your arms (e.g. wheelchair or bedside chair): None  To walk in hospital room: None  Climbing 3-5 steps with railing: A little  Basic Mobility - Total Score: 23                Education Documentation  Mobility Training, taught by Hilaria Cervantes, PT at 3/14/2025   2:15 PM.  Learner: Patient  Readiness: Acceptance  Method: Explanation  Response: Verbalizes Understanding    Education Comments  No comments found.

## 2025-03-14 NOTE — PROGRESS NOTES
03/14/25 0854   Discharge Planning   Living Arrangements Alone   Support Systems Children   Type of Residence Private residence   Do you have animals or pets at home? No   Who is requesting discharge planning? Provider   Expected Discharge Disposition Home   Stroke Family Assessment   Stroke Family Assessment Needed No   Intensity of Service   Intensity of Service 0-30 min     Met with pt, introduced myself and role.  Pt admit for chest pains.  Await cardiology.  Pt is up ad cirilo no needs per pt.   Tentative dc today.  Home address, insurance confirmed.  Hailey Peralta RN [facility]

## 2025-03-14 NOTE — DISCHARGE SUMMARY
Discharge Diagnosis  Chest pain, unspecified type    Issues Requiring Follow-Up  Patient fully evaluated  03/14  for   Assessment & Plan  Chest pain, unspecified type    Chest pain    Patient seen by cardiology today with plan to follow up with cardiology outpatient, cleared for discharge. No acute events overnight, no further symptoms, labs including cardiac biomarkers unremarkable. ECG without signs of ischemia. Patient with significant clinical improvement noted, patient medically cleared for discharge today. Patient seen resting in bed with head of bed elevated, no s/s or c/o acute difficulties at this time. Vital signs for last 24 hours:  Temp:  [36 °C (96.8 °F)-36.6 °C (97.9 °F)] 36.6 °C (97.9 °F)  Heart Rate:  [59-74] 60  Resp:  [18-29] 18  BP: (114-165)/(55-81) 115/58 Medications and labs reviewed-   Results for orders placed or performed during the hospital encounter of 03/13/25 (from the past 24 hours)   Troponin, High Sensitivity, 1 Hour   Result Value Ref Range    Troponin I, High Sensitivity 13 0 - 20 ng/L   Lavender Top   Result Value Ref Range    Extra Tube Hold for add-ons.    Lipid Panel   Result Value Ref Range    Cholesterol 110 0 - 199 mg/dL    HDL-Cholesterol 33.1 mg/dL    Cholesterol/HDL Ratio 3.3     LDL Calculated 48 <=99 mg/dL    VLDL 29 0 - 40 mg/dL    Triglycerides 145 0 - 149 mg/dL    Non HDL Cholesterol 77 0 - 149 mg/dL   POCT GLUCOSE   Result Value Ref Range    POCT Glucose 97 74 - 99 mg/dL   POCT GLUCOSE   Result Value Ref Range    POCT Glucose 111 (H) 74 - 99 mg/dL      Patient recently received an antibiotic (last 12 hours)       None           No results found for the last 90 days.     Continue aggressive pulmonary hygiene and oral hygiene. Off loading as tolerated for skin integrity.  Plan discussed with interdisciplinary team, ok to discharge, will continue current and repeat labs in the AM if patient still hospitalized. Patient aware and agreeable to current plan, continue plan  as above.     I spent 30 minutes on the date of the service which included preparing to see the patient, face-to-face patient care, completing clinical documentation, obtaining and/or reviewing separately obtained history, performing a medically appropriate examination, counseling and educating the patient/family/caregiver, ordering medications, tests, or procedures, communicating with other HCPs (not separately reported), independently interpreting results (not separately reported), communicating results to the patient/family/caregiver, and care coordination (not separately reported    Discharge Meds     Medication List      CONTINUE taking these medications     Dexcom G7 Sensor device; Generic drug: blood-glucose sensor; Use as   directed to measure glucose in real time changing every 10 days     ASK your doctor about these medications     aspirin 81 mg EC tablet   atorvastatin 80 mg tablet; Commonly known as: Lipitor; Take 1 tablet (80   mg) by mouth once daily.   carvedilol 12.5 mg tablet; Commonly known as: Coreg; Take 0.5 tablets   (6.25 mg) by mouth 2 times a day with meals.   clopidogrel 75 mg tablet; Commonly known as: Plavix   empagliflozin 25 mg; Commonly known as: Jardiance; Take 1 tablet (25 mg)   by mouth once daily.   escitalopram 10 mg tablet; Commonly known as: Lexapro; Take 1 tablet (10   mg) by mouth once daily.   Fiasp FlexTouch U-100 Insulin 100 unit/mL (3 mL) pen; Generic drug:   insulin aspart (with niacinamide)   glipiZIDE 5 mg tablet; Commonly known as: Glucotrol; Take 0.5 tablets   (2.5 mg) by mouth early in the morning..   Gvoke HypoPen 2-Pack 1 mg/0.2 mL auto-injector; Generic drug: glucagon   insulin glargine-yfgn 100 unit/mL (3 mL) pen   isosorbide mononitrate ER 30 mg 24 hr tablet; Commonly known as: Imdur   lidocaine 5 % patch; Commonly known as: Lidoderm; Place 1 patch over 12   hours on the skin once daily. Remove & discard patch within 12 hours or as   directed by MD.    lidocaine-menthol 4-1 % liquid roll-on   lisinopril 20 mg tablet; Take 1 tablet (20 mg) by mouth once daily.   * metFORMIN  mg 24 hr tablet; Commonly known as: Glucophage-XR   * metFORMIN 500 mg tablet; Commonly known as: Glucophage; Take 1 tablet   (500 mg) by mouth 2 times a day.   nitroglycerin 0.4 mg SL tablet; Commonly known as: Nitrostat   omeprazole OTC 20 mg EC tablet; Commonly known as: PriLOSEC OTC; Take 1   tablet (20 mg) by mouth once daily for 14 days. Do not crush, chew, or   split.   orphenadrine 100 mg 12 hr tablet; Commonly known as: Norflex; Take 1   tablet (100 mg) by mouth 2 times a day as needed for muscle spasms for up   to 10 days. Do not crush, chew, or split.   pantoprazole 20 mg EC tablet; Commonly known as: ProtoNix   rivastigmine 3 mg capsule; Commonly known as: Exelon   semaglutide 0.25 mg or 0.5 mg (2 mg/3 mL) pen injector; Inject 0.5 mg   under the skin every 7 days.  * This list has 2 medication(s) that are the same as other medications   prescribed for you. Read the directions carefully, and ask your doctor or   other care provider to review them with you.       Test Results Pending At Discharge  Pending Labs       No current pending labs.            Hospital Course      Vanessa Olivo  Coordinator  Internal Medicine     H&P     Incomplete     Date of Service: 3/13/2025  5:46 PM     Incomplete       Expand All Collapse All    History Of Present Illness  Victorino Carr is a 59 y.o. male with history of CAD, HTN, presence of cardiac pacemaker presented to ED complaining of left-sided chest pain with associated diaphoresis and shortness of breath, onset 3 hours prior to arriving to ED.  Pain radiated to left shoulder, which improved with 2 nitroglycerin at home, also took his aspirin.  States he will experience chest pain at times that is usually responsive to nitroglycerin.  He states he was supposed have an appointment tomorrow afternoon with cardiology to evaluate for potential  new pacemaker battery.  He denies fevers, cough, shortness of breath, congestion, palpitations.  Currently denying any symptoms, states he would be at work if he could.     ED course: Vitals stable.  Per radiology, there is no acute process on CXR.  Labs including cardiac biomarkers unremarkable.  ECG without signs of ischemia.  Patient treated with nitroglycerin and cardiology consulted.     Past Medical History  Medical History        Past Medical History:   Diagnosis Date    Diabetes mellitus (Multi)      Hypertension              Surgical History  Surgical History         Past Surgical History:   Procedure Laterality Date    OTHER SURGICAL HISTORY   08/26/2019     Pacemaker insertion    OTHER SURGICAL HISTORY   08/26/2019     Coronary artery stent placement            Social History  He reports that he has quit smoking. His smoking use included cigarettes. He has never used smokeless tobacco. He reports that he does not drink alcohol and does not use drugs.     Family History  Family History          Family History   Problem Relation Name Age of Onset    Dementia Mother                Allergies  Patient has no known allergies.     Review of Systems   Constitutional:  Positive for fatigue.   Respiratory:  Positive for chest tightness and shortness of breath.    Cardiovascular:  Positive for chest pain, palpitations and leg swelling.      10 point ROS negative except as specified in HPI     Physical Exam  HENT:      Head: Atraumatic.      Nose: Nose normal.   Eyes:      Conjunctiva/sclera: Conjunctivae normal.   Cardiovascular:      Rate and Rhythm: Normal rate and regular rhythm.      Pulses: Normal pulses.      Heart sounds: Normal heart sounds.   Pulmonary:      Effort: Pulmonary effort is normal.      Breath sounds: Normal breath sounds.   Abdominal:      Palpations: Abdomen is soft.   Musculoskeletal:         General: No swelling.   Skin:     General: Skin is warm and dry.   Neurological:      Mental Status:  He is alert. Mental status is at baseline.   Psychiatric:         Behavior: Behavior normal.            Last Recorded Vitals  Blood pressure 115/58, pulse 60, temperature 36.6 °C (97.9 °F), resp. rate 18, weight 122 kg (270 lb), SpO2 93%.     Relevant Results           Results for orders placed or performed during the hospital encounter of 03/13/25 (from the past 24 hours)   Troponin, High Sensitivity, 1 Hour   Result Value Ref Range     Troponin I, High Sensitivity 13 0 - 20 ng/L   Lavender Top   Result Value Ref Range     Extra Tube Hold for add-ons.     Lipid Panel   Result Value Ref Range     Cholesterol 110 0 - 199 mg/dL     HDL-Cholesterol 33.1 mg/dL     Cholesterol/HDL Ratio 3.3       LDL Calculated 48 <=99 mg/dL     VLDL 29 0 - 40 mg/dL     Triglycerides 145 0 - 149 mg/dL     Non HDL Cholesterol 77 0 - 149 mg/dL   POCT GLUCOSE   Result Value Ref Range     POCT Glucose 97 74 - 99 mg/dL   POCT GLUCOSE   Result Value Ref Range     POCT Glucose 111 (H) 74 - 99 mg/dL      ECG 12 lead     Result Date: 3/14/2025  Sinus rhythm Incomplete right bundle branch block Inferior infarct, old Consider anterior infarct Lateral leads are also involved     XR chest 1 view     Result Date: 3/13/2025  STUDY: Chest Radiograph;  03/13/2025, 1:45 PM. INDICATION: Generalized chest pain. COMPARISON: CXR: 01/04/24, 07/18/23, 01/26/23. ACCESSION NUMBER(S): [id number] ORDERING CLINICIAN: TAVO TERRY TECHNIQUE:  Frontal chest was obtained at 13:45 hours. FINDINGS: CARDIOMEDIASTINAL SILHOUETTE: Cardiomediastinal silhouette is normal in size and configuration. There is a pacemaker on the right.  Sternal wires are present.  LUNGS: Lungs are clear.  ABDOMEN: No remarkable upper abdominal findings.  BONES: No acute osseous changes.     No acute disease or change. Signed by Jim Castle MD            Assessment/Plan     Assessment & Plan  Chest pain, unspecified type     Chest pain     59-year-old male with history of CAD and presence  of cardiac pacemaker presented to ED with angina responsive to nitroglycerin.  Workup negative for acute process/cardiac emergency.  Patient treated with 1 dose of nitroglycerin and admitted to observation with telemetry and cardiology consult under Dr. Schmid for further evaluation and care.     #Chest pain  - Continue nitro as needed with BP parameters  - ECG per ACS symptoms  - Cardio consulted in ED, appreciate recs  - Vitals every 4 hours, telemetry  - Cardiac/carb consistent diet  - Continue anticoag, beta-blocker     Chronic conditions  #CAD  #Hyperlipidemia  #Hypertension  #T2DM  #Angina  #Depression  - Continue home meds              Active Orders   Diet     Adult diet Cardiac, Consistent Carb; CCD 75 gm/meal; 70 gm fat; 2 - 3 grams Sodium       Frequency: Effective now       Number of Occurrences: Until Specified   Nursing     Activity (specify) No Restrictions       Frequency: Until discontinued       Number of Occurrences: Until Specified     Apply sequential compression device       Frequency: Until discontinued       Number of Occurrences: Until Specified     Cardiac Monitoring - ED/ICU/PACU Only       Frequency: Until discontinued       Number of Occurrences: Until Specified     Glucose 10-70 mg/dL & CONSCIOUS- Give 15 Grams of Carbohydrates and repeat until blood glucose level reaches 100 mg/dL or greater.       Frequency: Until discontinued       Number of Occurrences: Until Specified       Order Comments: Select 1 of the following:  -1 cup skim milk  -3 or 4 glucose tablets  -4 ounces of fruit juice or regular soda.  Patient MUST be CONSCIOUS and able to eat or drink        Notify provider       Frequency: Until discontinued       Number of Occurrences: Until Specified     Notify provider (specify parameters)       Frequency: Until discontinued       Number of Occurrences: Until Specified       Order Comments: Kaplan Hypoglycemia interventions.        Notify provider (specify parameters)        Frequency: Until discontinued       Number of Occurrences: Until Specified       Order Comments: For blood glucose greater than 200 mg/dL twice over 24 hours.  Provider to consider Endocrine Consult.        Notify provider (specify parameters)       Frequency: Until discontinued       Number of Occurrences: Until Specified     Notify provider (specify parameters)       Frequency: Until discontinued       Number of Occurrences: Until Specified       Order Comments: [place] Hypoglycemia interventions.        Notify provider (specify parameters)       Frequency: Until discontinued       Number of Occurrences: Until Specified       Order Comments: For blood glucose greater than 200 mg/dL twice over 24 hours.  Provider to consider Endocrine Consult.        Notify provider (specify parameters)       Frequency: Until discontinued       Number of Occurrences: Until Specified     Vital Signs       Frequency: q4h       Number of Occurrences: Until Specified     Vital Signs       Frequency: q4h       Number of Occurrences: Until Specified   Code Status     Full code       Frequency: Continuous       Number of Occurrences: Until Specified   Consult     Inpatient consult to Cardiology       Frequency: Once       Number of Occurrences: 1 Occurrences   Nourishments     May Participate in Room Service       Frequency: Once       Number of Occurrences: 1 Occurrences   OT     OT eval and treat       Frequency: Until therapy completed       Number of Occurrences: 1 Occurrences   PT     PT eval and treat       Frequency: Until therapy completed       Number of Occurrences: 1 Occurrences   Respiratory Care     Pulse oximetry, spot       Frequency: Once       Number of Occurrences: 1 Occurrences   Card Rehab     Cardiac rehab evaluation       Frequency: Once       Number of Occurrences: 1 Occurrences   ECG     ECG 12 lead       Frequency: q1h       Number of Occurrences: 2 Occurrences     ECG 12 lead       Frequency: PRN       Number  of Occurrences: Until Specified       Order Comments: Repeat ECG every 15-30 min for one hour with non-diagnostic ECG and ongoing symp.        Electrocardiogram, 12-lead PRN ACS symptoms       Frequency: PRN       Number of Occurrences: Until Specified       Order Comments: Notify provider if performed.      Point of Care Testing - Docked Device     POCT Glucose       Frequency: 4x daily - AC and at bedtime       Number of Occurrences: 3 Days       Order Comments: And PRN           POCT Glucose       Frequency: PRN       Number of Occurrences: Until Specified       Order Comments: PRN for hypoglycemia interventions instituted.  Retest blood glucose level every 15 minutes after every hypoglycemic intervention until blood glucose is greater than 100 mg/dL.         Telemetry     Telemetry monitoring - Floor only       Frequency: Until discontinued       Number of Occurrences: 3 Days   Medications     acetaminophen (Tylenol) oral liquid 650 mg       Linked Order: Or       Frequency: q4h PRN       Dose: 650 mg       Route: oral     acetaminophen (Tylenol) suppository 650 mg       Linked Order: Or       Frequency: q4h PRN       Dose: 650 mg       Route: rectal     acetaminophen (Tylenol) tablet 650 mg       Linked Order: Or       Frequency: q4h PRN       Dose: 650 mg       Route: oral     aspirin EC tablet 81 mg       Frequency: Daily       Dose: 81 mg       Route: oral     atorvastatin (Lipitor) tablet 80 mg       Frequency: Daily       Dose: 80 mg       Route: oral     carvedilol (Coreg) tablet 12.5 mg       Frequency: Daily       Dose: 12.5 mg       Route: oral     clopidogrel (Plavix) tablet 75 mg       Frequency: Daily       Dose: 75 mg       Route: oral     dextrose 50 % injection 12.5 g       Frequency: q15 min PRN       Dose: 12.5 g       Route: intravenous     dextrose 50 % injection 25 g       Frequency: q15 min PRN       Dose: 25 g       Route: intravenous     empagliflozin (Jardiance) tablet 25 mg        Frequency: Daily       Dose: 25 mg       Route: oral     enoxaparin (Lovenox) syringe 40 mg       Frequency: q24h       Dose: 40 mg       Route: subcutaneous     escitalopram (Lexapro) tablet 10 mg       Frequency: Daily       Dose: 10 mg       Route: oral     glucagon (Glucagen) injection 1 mg       Frequency: q15 min PRN       Dose: 1 mg       Route: intramuscular     glucagon (Glucagen) injection 1 mg       Frequency: q15 min PRN       Dose: 1 mg       Route: intramuscular     insulin glargine (Lantus) injection 60 Units       Frequency: Daily       Dose: 60 Units       Route: subcutaneous     isosorbide mononitrate ER (Imdur) 24 hr tablet 30 mg       Frequency: Daily       Dose: 30 mg       Route: oral     lisinopril tablet 20 mg       Frequency: Daily       Dose: 20 mg       Route: oral     nitroglycerin (Nitrostat) SL tablet 0.4 mg       Frequency: q5 min PRN       Dose: 0.4 mg       Route: sublingual     ondansetron (Zofran) injection 4 mg       Linked Order: Or       Frequency: q8h PRN       Dose: 4 mg       Route: intravenous     ondansetron (Zofran) tablet 4 mg       Linked Order: Or       Frequency: q8h PRN       Dose: 4 mg       Route: oral     oxygen (O2) therapy       Frequency: Continuous PRN - O2/gases       Route: inhalation     pantoprazole (ProtoNix) EC tablet 20 mg       Frequency: Daily before breakfast       Dose: 20 mg       Route: oral     pneumococcal conjugate 20-valent (PREVNAR 20) vaccine       Frequency: During hospitalization       Dose: 0.5 mL       Route: intramuscular     psyllium (Metamucil) 3.4 gram packet 1 packet       Frequency: Daily       Dose: 1 packet       Route: oral     rivastigmine (Exelon) capsule 3 mg       Frequency: Daily       Dose: 3 mg       Route: oral      Dietary Orders (From admission, onward)          Start     Ordered     03/13/25 1630   May Participate in Room Service  ( ROOM SERVICE MAY PARTICIPATE)  Once        Question:  .  Answer:  Yes    03/13/25  1629     03/13/25 1605   Adult diet Cardiac, Consistent Carb; CCD 75 gm/meal; 70 gm fat; 2 - 3 grams Sodium  Diet effective now        Question Answer Comment   Diet type Cardiac     Diet type Consistent Carb     Carb diet selection: CCD 75 gm/meal     Fat restriction: 70 gm fat     Sodium restriction: 2 - 3 grams Sodium         03/13/25 1604                       59 yrs@  ADMITDTTM@  Chest pain [R07.9]  Chest pain, unspecified type [R07.9]  [unfilled]  Weight: 122 kg (270 lb)     Vitals          Vitals:     03/13/25 1324 03/13/25 1400 03/13/25 1410 03/13/25 1414   BP: 142/82 135/73 141/69 114/56   Pulse: 66 61 66 68   Resp: 20 (!) 30   20   Temp: 36.9 °C (98.4 °F)         TempSrc:           SpO2: 95% 95%   94%   Weight: 122 kg (270 lb)           03/13/25 1430 03/13/25 1501 03/13/25 1600 03/13/25 1730   BP: 122/61 161/81 122/71 128/55   Pulse: 67 70 60 70   Resp: 20 20 (!) 22 19   Temp:           TempSrc:           SpO2: (!) 93% 94% (!) 93% 94%   Weight:             03/13/25 1800 03/13/25 2000 03/13/25 2015 03/14/25 0006   BP: 119/72   133/75 125/71   Pulse: 74 60 64 60   Resp: (!) 29 (!) 28 22 20   Temp:     36 °C (96.8 °F) 36.1 °C (97 °F)   TempSrc:     Temporal Temporal   SpO2: (!) 93% (!) 92% 95% 95%   Weight:             03/14/25 0525 03/14/25 0741 03/14/25 1137   BP: 165/75 138/77 115/58   Pulse: 60 59 60   Resp: 18 18     Temp: 36.1 °C (97 °F) 36.5 °C (97.7 °F) 36.6 °C (97.9 °F)   TempSrc: Temporal Temporal     SpO2: 95% 93% 93%   Weight:                        Chief Complaint    Chest Pain            Patient seen resting in bed with head of bed elevated, no s/s or c/o acute difficulties at this time.  Vital signs for last 24 hours Temp:  [36 °C (96.8 °F)-36.6 °C (97.9 °F)] 36.6 °C (97.9 °F)  Heart Rate:  [59-74] 60  Resp:  [18-30] 18  BP: (114-165)/(55-81) 115/58    No intake/output data recorded.  Patient still requiring frequent cardiac and SPO2 monitoring. Continue aggressive pulmonary hygiene and oral  hygiene. Off loading as tolerated for skin integrity. Medications and labs reviewed-    Patient recently received an antibiotic (last 12 hours)         None                    Results for orders placed or performed during the hospital encounter of 03/13/25 (from the past 96 hours)   ECG 12 lead   Result Value Ref Range     Ventricular Rate 60 BPM     Atrial Rate 60 BPM     NJ Interval 139 ms     QRS Duration 113 ms     QT Interval 408 ms     QTC Calculation(Bazett) 408 ms     P Axis 17 degrees     R Axis -88 degrees     T Axis 93 degrees     QRS Count 10 beats     Q Onset 253 ms     T Offset 457 ms     QTC Fredericia 408 ms   CBC and Auto Differential   Result Value Ref Range     WBC 11.5 (H) 4.4 - 11.3 x10*3/uL     nRBC 0.0 0.0 - 0.0 /100 WBCs     RBC 5.25 4.50 - 5.90 x10*6/uL     Hemoglobin 14.8 13.5 - 17.5 g/dL     Hematocrit 46.8 41.0 - 52.0 %     MCV 89 80 - 100 fL     MCH 28.2 26.0 - 34.0 pg     MCHC 31.6 (L) 32.0 - 36.0 g/dL     RDW 13.2 11.5 - 14.5 %     Platelets 278 150 - 450 x10*3/uL     Neutrophils % 67.2 40.0 - 80.0 %     Immature Granulocytes %, Automated 0.4 0.0 - 0.9 %     Lymphocytes % 23.0 13.0 - 44.0 %     Monocytes % 6.9 2.0 - 10.0 %     Eosinophils % 2.1 0.0 - 6.0 %     Basophils % 0.4 0.0 - 2.0 %     Neutrophils Absolute 7.73 (H) 1.20 - 7.70 x10*3/uL     Immature Granulocytes Absolute, Automated 0.05 0.00 - 0.70 x10*3/uL     Lymphocytes Absolute 2.64 1.20 - 4.80 x10*3/uL     Monocytes Absolute 0.79 0.10 - 1.00 x10*3/uL     Eosinophils Absolute 0.24 0.00 - 0.70 x10*3/uL     Basophils Absolute 0.05 0.00 - 0.10 x10*3/uL   Comprehensive Metabolic Panel   Result Value Ref Range     Glucose 174 (H) 74 - 99 mg/dL     Sodium 138 136 - 145 mmol/L     Potassium 4.3 3.5 - 5.3 mmol/L     Chloride 105 98 - 107 mmol/L     Bicarbonate 23 21 - 32 mmol/L     Anion Gap 14 10 - 20 mmol/L     Urea Nitrogen 20 6 - 23 mg/dL     Creatinine 1.31 (H) 0.50 - 1.30 mg/dL     eGFR 62 >60 mL/min/1.73m*2     Calcium 9.1 8.6  - 10.3 mg/dL     Albumin 3.9 3.4 - 5.0 g/dL     Alkaline Phosphatase 88 33 - 136 U/L     Total Protein 6.9 6.4 - 8.2 g/dL     AST 14 9 - 39 U/L     Bilirubin, Total 1.1 0.0 - 1.2 mg/dL     ALT 16 10 - 52 U/L   Magnesium   Result Value Ref Range     Magnesium 1.92 1.60 - 2.40 mg/dL   Troponin I, High Sensitivity, Initial   Result Value Ref Range     Troponin I, High Sensitivity 13 0 - 20 ng/L   B-type natriuretic peptide   Result Value Ref Range     BNP 70 0 - 99 pg/mL   Troponin, High Sensitivity, 1 Hour   Result Value Ref Range     Troponin I, High Sensitivity 13 0 - 20 ng/L   Lavender Top   Result Value Ref Range     Extra Tube Hold for add-ons.     Lipid Panel   Result Value Ref Range     Cholesterol 110 0 - 199 mg/dL     HDL-Cholesterol 33.1 mg/dL     Cholesterol/HDL Ratio 3.3       LDL Calculated 48 <=99 mg/dL     VLDL 29 0 - 40 mg/dL     Triglycerides 145 0 - 149 mg/dL     Non HDL Cholesterol 77 0 - 149 mg/dL   POCT GLUCOSE   Result Value Ref Range     POCT Glucose 97 74 - 99 mg/dL   POCT GLUCOSE   Result Value Ref Range     POCT Glucose 111 (H) 74 - 99 mg/dL         Patient fully evaluated on 03/13. Thorough record review performed of previous labs and notes from prior encounters. Plan discussed with interdisciplinary team, consults placed, appreciate input. Will continue current and repeat labs in the AM.       Discharge planning discussed with patient and care team. Therapy evaluations ordered. Patient aware and agreeable to current plan, continue plan as above.      I spent a total of 75 minutes on the date of the service which included preparing to see the patient, face-to-face patient care, completing clinical documentation, obtaining and/or reviewing separately obtained history, performing a medically appropriate examination, counseling and educating the patient/family/caregiver, ordering medications, tests, or procedures, communicating with other HCPs (not separately reported), independently  interpreting results (not separately reported), communicating results to the patient/family/caregiver, and care coordination (not separately reported).                                  Pertinent Physical Exam At Time of Discharge  Physical Exam  no acute events overnight, patient denies chest pain, worsening SOB at this time.  Outpatient Follow-Up  No future appointments.      Vanessa Olivo

## 2025-03-14 NOTE — CARE PLAN
The patient's goals for the shift include      The clinical goals for the shift include        Problem: Pain - Adult  Goal: Verbalizes/displays adequate comfort level or baseline comfort level  Outcome: Progressing

## 2025-03-14 NOTE — H&P
History Of Present Illness  Victorino Carr is a 59 y.o. male with history of CAD, HTN, presence of cardiac pacemaker presented to ED complaining of left-sided chest pain with associated diaphoresis and shortness of breath, onset 3 hours prior to arriving to ED.  Pain radiated to left shoulder, which improved with 2 nitroglycerin at home, also took his aspirin.  States he will experience chest pain at times that is usually responsive to nitroglycerin.  He states he was supposed have an appointment tomorrow afternoon with cardiology to evaluate for potential new pacemaker battery.  He denies fevers, cough, shortness of breath, congestion, palpitations.  Currently denying any symptoms, states he would be at work if he could.    ED course: Vitals stable.  Per radiology, there is no acute process on CXR.  Labs including cardiac biomarkers unremarkable.  ECG without signs of ischemia.  Patient treated with nitroglycerin and cardiology consulted.     Past Medical History  Past Medical History:   Diagnosis Date    Diabetes mellitus (Multi)     Hypertension        Surgical History  Past Surgical History:   Procedure Laterality Date    OTHER SURGICAL HISTORY  08/26/2019    Pacemaker insertion    OTHER SURGICAL HISTORY  08/26/2019    Coronary artery stent placement        Social History  He reports that he has quit smoking. His smoking use included cigarettes. He has never used smokeless tobacco. He reports that he does not drink alcohol and does not use drugs.    Family History  Family History   Problem Relation Name Age of Onset    Dementia Mother          Allergies  Patient has no known allergies.    Review of Systems   Constitutional:  Positive for fatigue.   Respiratory:  Positive for chest tightness and shortness of breath.    Cardiovascular:  Positive for chest pain, palpitations and leg swelling.     10 point ROS negative except as specified in HPI    Physical Exam  HENT:      Head: Atraumatic.      Nose: Nose normal.    Eyes:      Conjunctiva/sclera: Conjunctivae normal.   Cardiovascular:      Rate and Rhythm: Normal rate and regular rhythm.      Pulses: Normal pulses.      Heart sounds: Normal heart sounds.   Pulmonary:      Effort: Pulmonary effort is normal.      Breath sounds: Normal breath sounds.   Abdominal:      Palpations: Abdomen is soft.   Musculoskeletal:         General: No swelling.   Skin:     General: Skin is warm and dry.   Neurological:      Mental Status: He is alert. Mental status is at baseline.   Psychiatric:         Behavior: Behavior normal.          Last Recorded Vitals  Blood pressure 115/58, pulse 60, temperature 36.6 °C (97.9 °F), resp. rate 18, weight 122 kg (270 lb), SpO2 93%.    Relevant Results    Results for orders placed or performed during the hospital encounter of 03/13/25 (from the past 24 hours)   Troponin, High Sensitivity, 1 Hour   Result Value Ref Range    Troponin I, High Sensitivity 13 0 - 20 ng/L   Lavender Top   Result Value Ref Range    Extra Tube Hold for add-ons.    Lipid Panel   Result Value Ref Range    Cholesterol 110 0 - 199 mg/dL    HDL-Cholesterol 33.1 mg/dL    Cholesterol/HDL Ratio 3.3     LDL Calculated 48 <=99 mg/dL    VLDL 29 0 - 40 mg/dL    Triglycerides 145 0 - 149 mg/dL    Non HDL Cholesterol 77 0 - 149 mg/dL   POCT GLUCOSE   Result Value Ref Range    POCT Glucose 97 74 - 99 mg/dL   POCT GLUCOSE   Result Value Ref Range    POCT Glucose 111 (H) 74 - 99 mg/dL     ECG 12 lead    Result Date: 3/14/2025  Sinus rhythm Incomplete right bundle branch block Inferior infarct, old Consider anterior infarct Lateral leads are also involved    XR chest 1 view    Result Date: 3/13/2025  STUDY: Chest Radiograph;  03/13/2025, 1:45 PM. INDICATION: Generalized chest pain. COMPARISON: CXR: 01/04/24, 07/18/23, 01/26/23. ACCESSION NUMBER(S): [id number] ORDERING CLINICIAN: TAVO TERRY TECHNIQUE:  Frontal chest was obtained at 13:45 hours. FINDINGS: CARDIOMEDIASTINAL SILHOUETTE:  Cardiomediastinal silhouette is normal in size and configuration. There is a pacemaker on the right.  Sternal wires are present.  LUNGS: Lungs are clear.  ABDOMEN: No remarkable upper abdominal findings.  BONES: No acute osseous changes.    No acute disease or change. Signed by Jim Castle MD        Assessment/Plan   Assessment & Plan  Chest pain, unspecified type    Chest pain    59-year-old male with history of CAD and presence of cardiac pacemaker presented to ED with angina responsive to nitroglycerin.  Workup negative for acute process/cardiac emergency.  Patient treated with 1 dose of nitroglycerin and admitted to observation with telemetry and cardiology consult under Dr. Schmid for further evaluation and care.    #Chest pain  - Continue nitro as needed with BP parameters  - ECG per ACS symptoms  - Cardio consulted in ED, appreciate recs  - Vitals every 4 hours, telemetry  - Cardiac/carb consistent diet  - Continue anticoag, beta-blocker    Chronic conditions  #CAD  #Hyperlipidemia  #Hypertension  #T2DM  #Angina  #Depression  - Continue home meds          Active Orders   Diet    Adult diet Cardiac, Consistent Carb; CCD 75 gm/meal; 70 gm fat; 2 - 3 grams Sodium     Frequency: Effective now     Number of Occurrences: Until Specified   Nursing    Activity (specify) No Restrictions     Frequency: Until discontinued     Number of Occurrences: Until Specified    Apply sequential compression device     Frequency: Until discontinued     Number of Occurrences: Until Specified    Cardiac Monitoring - ED/ICU/PACU Only     Frequency: Until discontinued     Number of Occurrences: Until Specified    Glucose 10-70 mg/dL & CONSCIOUS- Give 15 Grams of Carbohydrates and repeat until blood glucose level reaches 100 mg/dL or greater.     Frequency: Until discontinued     Number of Occurrences: Until Specified     Order Comments: Select 1 of the following:  -1 cup skim milk  -3 or 4 glucose tablets  -4 ounces of fruit juice or  regular soda.  Patient MUST be CONSCIOUS and able to eat or drink      Notify provider     Frequency: Until discontinued     Number of Occurrences: Until Specified    Notify provider (specify parameters)     Frequency: Until discontinued     Number of Occurrences: Until Specified     Order Comments: [place] Hypoglycemia interventions.      Notify provider (specify parameters)     Frequency: Until discontinued     Number of Occurrences: Until Specified     Order Comments: For blood glucose greater than 200 mg/dL twice over 24 hours.  Provider to consider Endocrine Consult.      Notify provider (specify parameters)     Frequency: Until discontinued     Number of Occurrences: Until Specified    Notify provider (specify parameters)     Frequency: Until discontinued     Number of Occurrences: Until Specified     Order Comments: [place] Hypoglycemia interventions.      Notify provider (specify parameters)     Frequency: Until discontinued     Number of Occurrences: Until Specified     Order Comments: For blood glucose greater than 200 mg/dL twice over 24 hours.  Provider to consider Endocrine Consult.      Notify provider (specify parameters)     Frequency: Until discontinued     Number of Occurrences: Until Specified    Vital Signs     Frequency: q4h     Number of Occurrences: Until Specified    Vital Signs     Frequency: q4h     Number of Occurrences: Until Specified   Code Status    Full code     Frequency: Continuous     Number of Occurrences: Until Specified   Consult    Inpatient consult to Cardiology     Frequency: Once     Number of Occurrences: 1 Occurrences   Nourishments    May Participate in Room Service     Frequency: Once     Number of Occurrences: 1 Occurrences   OT    OT eval and treat     Frequency: Until therapy completed     Number of Occurrences: 1 Occurrences   PT    PT eval and treat     Frequency: Until therapy completed     Number of Occurrences: 1 Occurrences   Respiratory Care    Pulse  oximetry, spot     Frequency: Once     Number of Occurrences: 1 Occurrences   Card Rehab    Cardiac rehab evaluation     Frequency: Once     Number of Occurrences: 1 Occurrences   ECG    ECG 12 lead     Frequency: q1h     Number of Occurrences: 2 Occurrences    ECG 12 lead     Frequency: PRN     Number of Occurrences: Until Specified     Order Comments: Repeat ECG every 15-30 min for one hour with non-diagnostic ECG and ongoing symp.      Electrocardiogram, 12-lead PRN ACS symptoms     Frequency: PRN     Number of Occurrences: Until Specified     Order Comments: Notify provider if performed.     Point of Care Testing - Docked Device    POCT Glucose     Frequency: 4x daily - AC and at bedtime     Number of Occurrences: 3 Days     Order Comments: And PRN        POCT Glucose     Frequency: PRN     Number of Occurrences: Until Specified     Order Comments: PRN for hypoglycemia interventions instituted.  Retest blood glucose level every 15 minutes after every hypoglycemic intervention until blood glucose is greater than 100 mg/dL.       Telemetry    Telemetry monitoring - Floor only     Frequency: Until discontinued     Number of Occurrences: 3 Days   Medications    acetaminophen (Tylenol) oral liquid 650 mg     Linked Order: Or     Frequency: q4h PRN     Dose: 650 mg     Route: oral    acetaminophen (Tylenol) suppository 650 mg     Linked Order: Or     Frequency: q4h PRN     Dose: 650 mg     Route: rectal    acetaminophen (Tylenol) tablet 650 mg     Linked Order: Or     Frequency: q4h PRN     Dose: 650 mg     Route: oral    aspirin EC tablet 81 mg     Frequency: Daily     Dose: 81 mg     Route: oral    atorvastatin (Lipitor) tablet 80 mg     Frequency: Daily     Dose: 80 mg     Route: oral    carvedilol (Coreg) tablet 12.5 mg     Frequency: Daily     Dose: 12.5 mg     Route: oral    clopidogrel (Plavix) tablet 75 mg     Frequency: Daily     Dose: 75 mg     Route: oral    dextrose 50 % injection 12.5 g     Frequency: q15  min PRN     Dose: 12.5 g     Route: intravenous    dextrose 50 % injection 25 g     Frequency: q15 min PRN     Dose: 25 g     Route: intravenous    empagliflozin (Jardiance) tablet 25 mg     Frequency: Daily     Dose: 25 mg     Route: oral    enoxaparin (Lovenox) syringe 40 mg     Frequency: q24h     Dose: 40 mg     Route: subcutaneous    escitalopram (Lexapro) tablet 10 mg     Frequency: Daily     Dose: 10 mg     Route: oral    glucagon (Glucagen) injection 1 mg     Frequency: q15 min PRN     Dose: 1 mg     Route: intramuscular    glucagon (Glucagen) injection 1 mg     Frequency: q15 min PRN     Dose: 1 mg     Route: intramuscular    insulin glargine (Lantus) injection 60 Units     Frequency: Daily     Dose: 60 Units     Route: subcutaneous    isosorbide mononitrate ER (Imdur) 24 hr tablet 30 mg     Frequency: Daily     Dose: 30 mg     Route: oral    lisinopril tablet 20 mg     Frequency: Daily     Dose: 20 mg     Route: oral    nitroglycerin (Nitrostat) SL tablet 0.4 mg     Frequency: q5 min PRN     Dose: 0.4 mg     Route: sublingual    ondansetron (Zofran) injection 4 mg     Linked Order: Or     Frequency: q8h PRN     Dose: 4 mg     Route: intravenous    ondansetron (Zofran) tablet 4 mg     Linked Order: Or     Frequency: q8h PRN     Dose: 4 mg     Route: oral    oxygen (O2) therapy     Frequency: Continuous PRN - O2/gases     Route: inhalation    pantoprazole (ProtoNix) EC tablet 20 mg     Frequency: Daily before breakfast     Dose: 20 mg     Route: oral    pneumococcal conjugate 20-valent (PREVNAR 20) vaccine     Frequency: During hospitalization     Dose: 0.5 mL     Route: intramuscular    psyllium (Metamucil) 3.4 gram packet 1 packet     Frequency: Daily     Dose: 1 packet     Route: oral    rivastigmine (Exelon) capsule 3 mg     Frequency: Daily     Dose: 3 mg     Route: oral     Dietary Orders (From admission, onward)       Start     Ordered    03/13/25 1630  May Participate in Room Service  ( ROOM  SERVICE MAY PARTICIPATE)  Once        Question:  .  Answer:  Yes    03/13/25 1629    03/13/25 1605  Adult diet Cardiac, Consistent Carb; CCD 75 gm/meal; 70 gm fat; 2 - 3 grams Sodium  Diet effective now        Question Answer Comment   Diet type Cardiac    Diet type Consistent Carb    Carb diet selection: CCD 75 gm/meal    Fat restriction: 70 gm fat    Sodium restriction: 2 - 3 grams Sodium        03/13/25 1604                  59 yrs@  ADMITDTTM@  Chest pain [R07.9]  Chest pain, unspecified type [R07.9]  [unfilled]  Weight: 122 kg (270 lb)     Vitals:    03/13/25 1324 03/13/25 1400 03/13/25 1410 03/13/25 1414   BP: 142/82 135/73 141/69 114/56   Pulse: 66 61 66 68   Resp: 20 (!) 30  20   Temp: 36.9 °C (98.4 °F)      TempSrc:       SpO2: 95% 95%  94%   Weight: 122 kg (270 lb)       03/13/25 1430 03/13/25 1501 03/13/25 1600 03/13/25 1730   BP: 122/61 161/81 122/71 128/55   Pulse: 67 70 60 70   Resp: 20 20 (!) 22 19   Temp:       TempSrc:       SpO2: (!) 93% 94% (!) 93% 94%   Weight:        03/13/25 1800 03/13/25 2000 03/13/25 2015 03/14/25 0006   BP: 119/72  133/75 125/71   Pulse: 74 60 64 60   Resp: (!) 29 (!) 28 22 20   Temp:   36 °C (96.8 °F) 36.1 °C (97 °F)   TempSrc:   Temporal Temporal   SpO2: (!) 93% (!) 92% 95% 95%   Weight:        03/14/25 0525 03/14/25 0741 03/14/25 1137   BP: 165/75 138/77 115/58   Pulse: 60 59 60   Resp: 18 18    Temp: 36.1 °C (97 °F) 36.5 °C (97.7 °F) 36.6 °C (97.9 °F)   TempSrc: Temporal Temporal    SpO2: 95% 93% 93%   Weight:               Chief Complaint    Chest Pain         Patient seen resting in bed with head of bed elevated, no s/s or c/o acute difficulties at this time.  Vital signs for last 24 hours Temp:  [36 °C (96.8 °F)-36.6 °C (97.9 °F)] 36.6 °C (97.9 °F)  Heart Rate:  [59-74] 60  Resp:  [18-30] 18  BP: (114-165)/(55-81) 115/58    No intake/output data recorded.  Patient still requiring frequent cardiac and SPO2 monitoring. Continue aggressive pulmonary hygiene and oral  hygiene. Off loading as tolerated for skin integrity. Medications and labs reviewed-    Patient recently received an antibiotic (last 12 hours)       None            Results for orders placed or performed during the hospital encounter of 03/13/25 (from the past 96 hours)   ECG 12 lead   Result Value Ref Range    Ventricular Rate 60 BPM    Atrial Rate 60 BPM    MD Interval 139 ms    QRS Duration 113 ms    QT Interval 408 ms    QTC Calculation(Bazett) 408 ms    P Axis 17 degrees    R Axis -88 degrees    T Axis 93 degrees    QRS Count 10 beats    Q Onset 253 ms    T Offset 457 ms    QTC Fredericia 408 ms   CBC and Auto Differential   Result Value Ref Range    WBC 11.5 (H) 4.4 - 11.3 x10*3/uL    nRBC 0.0 0.0 - 0.0 /100 WBCs    RBC 5.25 4.50 - 5.90 x10*6/uL    Hemoglobin 14.8 13.5 - 17.5 g/dL    Hematocrit 46.8 41.0 - 52.0 %    MCV 89 80 - 100 fL    MCH 28.2 26.0 - 34.0 pg    MCHC 31.6 (L) 32.0 - 36.0 g/dL    RDW 13.2 11.5 - 14.5 %    Platelets 278 150 - 450 x10*3/uL    Neutrophils % 67.2 40.0 - 80.0 %    Immature Granulocytes %, Automated 0.4 0.0 - 0.9 %    Lymphocytes % 23.0 13.0 - 44.0 %    Monocytes % 6.9 2.0 - 10.0 %    Eosinophils % 2.1 0.0 - 6.0 %    Basophils % 0.4 0.0 - 2.0 %    Neutrophils Absolute 7.73 (H) 1.20 - 7.70 x10*3/uL    Immature Granulocytes Absolute, Automated 0.05 0.00 - 0.70 x10*3/uL    Lymphocytes Absolute 2.64 1.20 - 4.80 x10*3/uL    Monocytes Absolute 0.79 0.10 - 1.00 x10*3/uL    Eosinophils Absolute 0.24 0.00 - 0.70 x10*3/uL    Basophils Absolute 0.05 0.00 - 0.10 x10*3/uL   Comprehensive Metabolic Panel   Result Value Ref Range    Glucose 174 (H) 74 - 99 mg/dL    Sodium 138 136 - 145 mmol/L    Potassium 4.3 3.5 - 5.3 mmol/L    Chloride 105 98 - 107 mmol/L    Bicarbonate 23 21 - 32 mmol/L    Anion Gap 14 10 - 20 mmol/L    Urea Nitrogen 20 6 - 23 mg/dL    Creatinine 1.31 (H) 0.50 - 1.30 mg/dL    eGFR 62 >60 mL/min/1.73m*2    Calcium 9.1 8.6 - 10.3 mg/dL    Albumin 3.9 3.4 - 5.0 g/dL    Alkaline  Phosphatase 88 33 - 136 U/L    Total Protein 6.9 6.4 - 8.2 g/dL    AST 14 9 - 39 U/L    Bilirubin, Total 1.1 0.0 - 1.2 mg/dL    ALT 16 10 - 52 U/L   Magnesium   Result Value Ref Range    Magnesium 1.92 1.60 - 2.40 mg/dL   Troponin I, High Sensitivity, Initial   Result Value Ref Range    Troponin I, High Sensitivity 13 0 - 20 ng/L   B-type natriuretic peptide   Result Value Ref Range    BNP 70 0 - 99 pg/mL   Troponin, High Sensitivity, 1 Hour   Result Value Ref Range    Troponin I, High Sensitivity 13 0 - 20 ng/L   Lavender Top   Result Value Ref Range    Extra Tube Hold for add-ons.    Lipid Panel   Result Value Ref Range    Cholesterol 110 0 - 199 mg/dL    HDL-Cholesterol 33.1 mg/dL    Cholesterol/HDL Ratio 3.3     LDL Calculated 48 <=99 mg/dL    VLDL 29 0 - 40 mg/dL    Triglycerides 145 0 - 149 mg/dL    Non HDL Cholesterol 77 0 - 149 mg/dL   POCT GLUCOSE   Result Value Ref Range    POCT Glucose 97 74 - 99 mg/dL   POCT GLUCOSE   Result Value Ref Range    POCT Glucose 111 (H) 74 - 99 mg/dL       Patient fully evaluated on 03/13. Thorough record review performed of previous labs and notes from prior encounters. Plan discussed with interdisciplinary team, consults placed, appreciate input. Will continue current and repeat labs in the AM.      Discharge planning discussed with patient and care team. Therapy evaluations ordered. Patient aware and agreeable to current plan, continue plan as above.     I spent a total of 75 minutes on the date of the service which included preparing to see the patient, face-to-face patient care, completing clinical documentation, obtaining and/or reviewing separately obtained history, performing a medically appropriate examination, counseling and educating the patient/family/caregiver, ordering medications, tests, or procedures, communicating with other HCPs (not separately reported), independently interpreting results (not separately reported), communicating results to the  patient/family/caregiver, and care coordination (not separately reported).